# Patient Record
Sex: MALE | Race: WHITE | Employment: PART TIME | URBAN - METROPOLITAN AREA
[De-identification: names, ages, dates, MRNs, and addresses within clinical notes are randomized per-mention and may not be internally consistent; named-entity substitution may affect disease eponyms.]

---

## 2017-05-16 ENCOUNTER — TRANSFERRED RECORDS (OUTPATIENT)
Dept: HEALTH INFORMATION MANAGEMENT | Facility: CLINIC | Age: 67
End: 2017-05-16

## 2017-08-17 ENCOUNTER — OFFICE VISIT (OUTPATIENT)
Dept: CARDIOLOGY | Facility: CLINIC | Age: 67
End: 2017-08-17
Attending: INTERNAL MEDICINE
Payer: COMMERCIAL

## 2017-08-17 VITALS
HEIGHT: 66 IN | HEART RATE: 64 BPM | BODY MASS INDEX: 30.02 KG/M2 | SYSTOLIC BLOOD PRESSURE: 128 MMHG | DIASTOLIC BLOOD PRESSURE: 86 MMHG | WEIGHT: 186.8 LBS

## 2017-08-17 DIAGNOSIS — I25.10 CORONARY ARTERY DISEASE INVOLVING NATIVE CORONARY ARTERY OF NATIVE HEART WITHOUT ANGINA PECTORIS: ICD-10-CM

## 2017-08-17 DIAGNOSIS — E78.00 HYPERCHOLESTEREMIA: ICD-10-CM

## 2017-08-17 PROCEDURE — 99212 OFFICE O/P EST SF 10 MIN: CPT | Performed by: INTERNAL MEDICINE

## 2017-08-17 NOTE — LETTER
8/17/2017    CHRISTIN OLIVERA  [facility]   [address] [place] [address]    RE: Hardy Landry       Dear Colleague,    I had the pleasure of seeing Hardy Landry in the [facility].    Hardy Landry, a 67-year-old man with coronary artery disease and dyslipidemia was seen today at your request for followup.  The patient sustained an acute inferolateral wall ST segment elevation MI in 10/2010 and underwent implantation of a 2.75 x 18 mm length everolimus-eluting stent in the circumflex.  At the time of his MI, there were significant narrowings present in the mid LAD and distal right coronary.  The patient was unwilling to consider bypass surgery.  In 11/2010, we attempted to stent the mid LAD, but were unable to stent the vessel due to tortuosity and calcification. The patient underwent plain balloon angioplasty of the mid-LAD with good angiographic results.  In 2011, he returned for repeat angiography which demonstrated the  intervention sites in the circumflex and the mid LAD remained patent.  A 3.5 x 15 mm length everolimus-eluting stent was placed in the distal right coronary.  The patient's last angiogram in 2012 showed a normal ejection fraction of 50-55% with continued patency at all  intervention sites with 0% stenosis at the CX and RCA intervention sites and a 40% mid vessel LAD stenosis with CHRISTI 3 flow.     Mr. Landry continues to remain free of angina since I saw him 1 year ago. You have  questioned whether he should be on a beta blocker.  The patient has had well controlled blood pressures and lipid levels.      PAST MEDICAL HISTORY:   1.  Coronary artery disease.   a.  Acute inferolateral infarction in 2010.  Status post in-stent implantation in circumflex.   b.  Status balloon status post balloon angioplasty of mid LAD.   c.  Status post in-stent implantation in distal right coronary.     d.  Repeat angiography 2012 showing continued patency at all  intervention sites with normal ejection fraction.   2.  Dyslipidemia, on simvastatin.      PHYSICAL EXAMINATION:   GENERAL:  Exam today demonstrates a very pleasant, cooperative and intelligent 67-year-old man.   VITAL SIGNS:  His blood pressure is 128/86, his heart rate is 64.  His height is 1.7 meters, his weight is 84.7 kg.  His BMI is 30.2.   LUNGS:  Clear to percussion and auscultation.   CARDIOVASCULAR:  Shows a normal S1 with a normal S2.  There is no S3.  There is no murmur, rub or click.     Outpatient Encounter Prescriptions as of 8/17/2017   Medication Sig Dispense Refill     Fluticasone-Salmeterol (ADVAIR HFA IN)        simvastatin (ZOCOR) 40 MG tablet Take 1 tablet (40 mg) by mouth At Bedtime 90 tablet 0     aspirin 81 MG tablet Take 81 mg by mouth daily.       OMEPRAZOLE PO Take 20 mg by mouth as needed        No facility-administered encounter medications on file as of 8/17/2017.       ASSESSMENT:  Mr. Landry is asymptomatic on guideline-directed medical therapy with satisfactory systolic blood pressure and LDL cholesterol levels.  Presently, there is not clear evidence that beta blockers are beneficial in asymptomatic patients with well-preserved left ventricular function and normal systolic blood pressure over 5 years out from MI.   Mr. Landry has always indicated he prefers to take as few medications as possible and has been compliant with asa and statin therapy.  As long as the patient is asymptomatic, I would continue the present medical therapy.  Should he have recurrent symptoms, we will evaluate promptly.      RECOMMENDATIONS:   1.  Continue present medical therapy.   2.  Mediterranean style diet.   3.  Followup visit in 1 year, earlier if new symptoms.      We greatly appreciate the opportunity to see your patient, Mr. Hardy Landry.      Sincerely,    Jeet Simon MD     [facility]

## 2017-08-17 NOTE — PROGRESS NOTES
"HISTORY OF PRESENT ILLNESS:  No angina    Orders this Visit:  Orders Placed This Encounter   Procedures     Follow-Up with Cardiologist     No orders of the defined types were placed in this encounter.    There are no discontinued medications.    Encounter Diagnoses   Name Primary?     Hypercholesteremia      Coronary artery disease involving native coronary artery of native heart without angina pectoris        CURRENT MEDICATIONS:  Current Outpatient Prescriptions   Medication Sig Dispense Refill     Fluticasone-Salmeterol (ADVAIR HFA IN)        simvastatin (ZOCOR) 40 MG tablet Take 1 tablet (40 mg) by mouth At Bedtime 90 tablet 0     aspirin 81 MG tablet Take 81 mg by mouth daily.       OMEPRAZOLE PO Take 20 mg by mouth as needed          ALLERGIES   No Known Allergies    PAST MEDICAL, SURGICAL, FAMILY, SOCIAL HISTORY:  History was reviewed and updated as needed, see medical record.    Review of Systems:  A 12-point review of systems was completed, see medical record for detailed review of systems information.    Physical Exam:  Vitals: /86 (BP Location: Right arm, Patient Position: Sitting, Cuff Size: Adult Regular)  Pulse 64  Ht 1.676 m (5' 6\")  Wt 84.7 kg (186 lb 12.8 oz)  BMI 30.15 kg/m2    Constitutional:  cooperative, alert and oriented, well developed, well nourished, in no acute distress        Skin:  warm and dry to the touch, no apparent skin lesions or masses noted        Head:  normocephalic, no masses or lesions        Eyes:  pupils equal and round, conjunctivae and lids unremarkable, sclera white, no xanthalasma, EOMS intact, no nystagmus        ENT:  no pallor or cyanosis, dentition good        Neck:  carotid pulses are full and equal bilaterally, JVP normal, no carotid bruit, no thyromegaly        Chest:  normal breath sounds, clear to auscultation, normal A-P diameter, normal symmetry, normal respiratory excursion, no use of accessory muscles        Cardiac: regular rhythm, normal S1/S2, " no S3 or S4, apical impulse not displaced, no murmurs, gallops or rubs                  Abdomen:  abdomen soft, non-tender, BS normoactive, no mass, no HSM, no bruits        Vascular: pulses full and equal, no bruits auscultated                                      Extremities and Back:  no deformities, clubbing, cyanosis, erythema observed        Neurological:  affect appropriate, oriented to time, person and place        ASSESSMENT: Continue present meds, diet and exercise       RECOMMENDATIONS: fu in one year      Recent Lab Results:  LIPID RESULTS:  Lab Results   Component Value Date    CHOL 118 11/12/2010    HDL 47 11/12/2010    LDL 55 11/12/2010    TRIG 79 11/12/2010    CHOLHDLRATIO 2.5 11/12/2010       LIVER ENZYME RESULTS:  Lab Results   Component Value Date    ALT 19 11/12/2010       CBC RESULTS:  Lab Results   Component Value Date    WBC  11/28/2012     Charge credited   Test canceled by PCU/Clinic  [facility]  CORRECTED ON 11/28 AT 0911: PREVIOUSLY REPORTED AS 6.7    RBC  11/28/2012     Charge credited   Test canceled by PCU/Clinic  [facility]  CORRECTED ON 11/28 AT 0911: PREVIOUSLY REPORTED AS 4.52    HGB  11/28/2012     Charge credited   Test canceled by PCU/Clinic  [facility]  CORRECTED ON 11/28 AT 0911: PREVIOUSLY REPORTED AS 14.0    HCT  11/28/2012     Charge credited   Test canceled by PCU/Clinic  [facility]  CORRECTED ON 11/28 AT 0911: PREVIOUSLY REPORTED AS 41.0    MCV  11/28/2012     Charge credited   Test canceled by PCU/Clinic  [facility]  CORRECTED ON 11/28 AT 0911: PREVIOUSLY REPORTED AS 91    MCH  11/28/2012     Charge credited   Test canceled by PCU/Clinic  [facility]  CORRECTED ON 11/28 AT 0911: PREVIOUSLY REPORTED AS 31.0    MCHC  11/28/2012     Charge credited   Test canceled by PCU/Clinic  [facility]  CORRECTED ON 11/28 AT 0911: PREVIOUSLY REPORTED AS 34.1    RDW  11/28/2012     Charge credited   Test canceled by PCU/Clinic  [facility]  CORRECTED ON 11/28 AT 0911: PREVIOUSLY REPORTED AS 12.6    PLT  11/28/2012     Charge  credited   Test canceled by PCU/Clinic  [facility]  CORRECTED ON 11/28 AT 0911: PREVIOUSLY REPORTED        BMP RESULTS:  Lab Results   Component Value Date    NA  11/28/2012     Charge credited   Test canceled by PCU/Clinic  [facility]  CORRECTED ON 11/28 AT 0911: PREVIOUSLY REPORTED     POTASSIUM  11/28/2012     Charge credited   Test canceled by PCU/Clinic  [facility]  CORRECTED ON 11/28 AT 0911: PREVIOUSLY REPORTED AS 4.1    CHLORIDE  11/28/2012     Charge credited   Test canceled by PCU/Clinic  [facility]  CORRECTED ON 11/28 AT 0911: PREVIOUSLY REPORTED     CO2  11/28/2012     Charge credited   Test canceled by PCU/Clinic  [facility]  CORRECTED ON 11/28 AT 0911: PREVIOUSLY REPORTED AS 29    ANIONGAP  11/28/2012     Charge credited   Test canceled by PCU/Clinic  [facility]  CORRECTED ON 11/28 AT 0911: PREVIOUSLY REPORTED AS 9    GLC  11/28/2012     Charge credited   Test canceled by PCU/Clinic  [facility]  CORRECTED ON 11/28 AT 0911: PREVIOUSLY REPORTED AS 93    BUN  11/28/2012     Charge credited   Test canceled by PCU/Clinic  [facility]  CORRECTED ON 11/28 AT 0911: PREVIOUSLY REPORTED AS 15    CR  11/28/2012     Charge credited   Test canceled by PCU/Clinic  [facility]  CORRECTED ON 11/28 AT 0911: PREVIOUSLY REPORTED AS 1.28    GFRESTIMATED  11/28/2012     Charge credited   Test canceled by PCU/Clinic  [facility]  CORRECTED ON 11/28 AT 0911: PREVIOUSLY REPORTED AS 57    GFRESTBLACK  11/28/2012     Charge credited   Test canceled by PCU/Clinic  [facility]  CORRECTED ON 11/28 AT 0911: PREVIOUSLY REPORTED AS 69    MANNIE  11/28/2012     Charge credited   Test canceled by PCU/Clinic  [facility]  CORRECTED ON 11/28 AT 0911: PREVIOUSLY REPORTED AS 9.5        A1C RESULTS:  No results found for: A1C    INR RESULTS:  Lab Results   Component Value Date    INR  11/28/2012     Charge credited   Test canceled by PCU/Clinic  [facility]  CORRECTED ON 11/28 AT 0911: PREVIOUSLY REPORTED AS 0.96       We greatly appreciate the opportunity to be involved in the care of your  patient, Hardy REDDY Gris.    Sincerely,  Jeet Simon MD      CC  Jeet Simon MD  [address]  [place] [address]

## 2017-08-17 NOTE — PROGRESS NOTES
HISTORY OF PRESENT ILLNESS:  Hardy Landry, a 67-year-old man with coronary artery disease and dyslipidemia was seen today at your request for followup.  The patient sustained an acute inferolateral wall ST segment elevation MI in 10/2010 and underwent implantation of a 2.75 x 18 mm length everolimus-eluting stent in the circumflex.  At the time of his MI, there were significant narrowings present in the mid LAD and distal right coronary.  The patient was unwilling to consider bypass surgery.  In 11/2010, we attempted to stent the mid LAD, but were unable to stent the vessel due to tortuosity and calcification. The patient underwent plain balloon angioplasty of the mid-LAD with good angiographic results.  In 2011, he returned for repeat angiography which demonstrated the  intervention sites in the circumflex and the mid LAD remained patent.  A 3.5 x 15 mm length everolimus-eluting stent was placed in the distal right coronary.  The patient's last angiogram in 2012 showed a normal ejection fraction of 50-55% with continued patency at all  intervention sites with 0% stenosis at the CX and RCA intervention sites and a 40% mid vessel LAD stenosis with CHRISTI 3 flow.     Mr. Landry continues to remain free of angina since I saw him 1 year ago. You have  questioned whether he should be on a beta blocker.  The patient has had well controlled blood pressures and lipid levels.      PAST MEDICAL HISTORY:   1.  Coronary artery disease.   a.  Acute inferolateral infarction in 2010.  Status post in-stent implantation in circumflex.   b.  Status balloon status post balloon angioplasty of mid LAD.   c.  Status post in-stent implantation in distal right coronary.     d.  Repeat angiography 2012 showing continued patency at all intervention sites with normal ejection fraction.   2.  Dyslipidemia, on simvastatin.      PHYSICAL EXAMINATION:   GENERAL:  Exam today demonstrates a very pleasant, cooperative and intelligent 67-year-old  man.   VITAL SIGNS:  His blood pressure is 128/86, his heart rate is 64.  His height is 1.7 meters, his weight is 84.7 kg.  His BMI is 30.2.   LUNGS:  Clear to percussion and auscultation.   CARDIOVASCULAR:  Shows a normal S1 with a normal S2.  There is no S3.  There is no murmur, rub or click.      ASSESSMENT:  Mr. Landry is asymptomatic on guideline-directed medical therapy with satisfactory systolic blood pressure and LDL cholesterol levels.  Presently, there is not clear evidence that beta blockers are beneficial in asymptomatic patients with well-preserved left ventricular function and normal systolic blood pressure over 5 years out from MI.   Mr. Landry has always indicated he prefers to take as few medications as possible and has been compliant with asa and statin therapy.  As long as the patient is asymptomatic, I would continue the present medical therapy.  Should he have recurrent symptoms, we will evaluate promptly.      RECOMMENDATIONS:   1.  Continue present medical therapy.   2.  Mediterranean style diet.   3.  Followup visit in 1 year, earlier if new symptoms.      We greatly appreciate the opportunity to see your patient, Mr. Rae Landry.       cc:   Lamonte Leslie MD   [facility] [address] [place] [address]         HILARIO CORNELIUS MD             D: 2017 09:44   T: 2017 12:24   MT: [id number]      Name:     RAE LANDRY   MRN:      [id number]        Account:      [id number]   :      1950           Service Date: 2017      Document: [id number]

## 2018-08-14 ENCOUNTER — OFFICE VISIT (OUTPATIENT)
Dept: CARDIOLOGY | Facility: CLINIC | Age: 68
End: 2018-08-14
Attending: INTERNAL MEDICINE
Payer: COMMERCIAL

## 2018-08-14 VITALS
SYSTOLIC BLOOD PRESSURE: 128 MMHG | HEART RATE: 54 BPM | HEIGHT: 66 IN | BODY MASS INDEX: 29.25 KG/M2 | DIASTOLIC BLOOD PRESSURE: 82 MMHG | WEIGHT: 182 LBS

## 2018-08-14 DIAGNOSIS — I25.10 CORONARY ARTERY DISEASE INVOLVING NATIVE CORONARY ARTERY OF NATIVE HEART WITHOUT ANGINA PECTORIS: ICD-10-CM

## 2018-08-14 DIAGNOSIS — E78.00 HYPERCHOLESTEREMIA: ICD-10-CM

## 2018-08-14 PROCEDURE — 99213 OFFICE O/P EST LOW 20 MIN: CPT | Performed by: INTERNAL MEDICINE

## 2018-08-14 RX ORDER — IBUPROFEN 200 MG
400 TABLET ORAL EVERY 8 HOURS PRN
COMMUNITY
End: 2018-10-04

## 2018-08-14 NOTE — PROGRESS NOTES
"HISTORY OF PRESENT ILLNESS:  No chest pain or dyspnea.     Orders this Visit:  Orders Placed This Encounter   Procedures     Follow-Up with Cardiologist     Orders Placed This Encounter   Medications     ibuprofen (ADVIL/MOTRIN) 200 MG tablet     Sig: Take 400 mg by mouth every 8 hours as needed for mild pain     There are no discontinued medications.    Encounter Diagnoses   Name Primary?     Hypercholesteremia      Coronary artery disease involving native coronary artery of native heart without angina pectoris        CURRENT MEDICATIONS:  Current Outpatient Prescriptions   Medication Sig Dispense Refill     aspirin 81 MG tablet Take 81 mg by mouth daily.       Fluticasone-Salmeterol (ADVAIR HFA IN) Inhale into the lungs as needed        ibuprofen (ADVIL/MOTRIN) 200 MG tablet Take 400 mg by mouth every 8 hours as needed for mild pain       OMEPRAZOLE PO Take 20 mg by mouth 2 times daily (before meals)        simvastatin (ZOCOR) 40 MG tablet Take 1 tablet (40 mg) by mouth At Bedtime 90 tablet 0       ALLERGIES   No Known Allergies    PAST MEDICAL, SURGICAL, FAMILY, SOCIAL HISTORY:  History was reviewed and updated as needed, see medical record.    Review of Systems:  A 12-point review of systems was completed, see medical record for detailed review of systems information.    Physical Exam:  Vitals: /82 (BP Location: Right arm, Patient Position: Sitting, Cuff Size: Adult Large)  Pulse 54  Ht 1.676 m (5' 6\")  Wt 82.6 kg (182 lb)  BMI 29.38 kg/m2    Constitutional:  cooperative, alert and oriented, well developed, well nourished, in no acute distress        Skin:  warm and dry to the touch, no apparent skin lesions or masses noted        Head:  normocephalic, no masses or lesions        Eyes:  pupils equal and round, conjunctivae and lids unremarkable, sclera white, no xanthalasma, EOMS intact, no nystagmus        ENT:  no pallor or cyanosis, dentition good        Neck:  carotid pulses are full and equal " bilaterally, JVP normal, no carotid bruit        Chest:  normal breath sounds, clear to auscultation, normal A-P diameter, normal symmetry, normal respiratory excursion, no use of accessory muscles        Cardiac: regular rhythm, normal S1/S2, no S3 or S4, apical impulse not displaced, no murmurs, gallops or rubs                  Abdomen:  abdomen soft, non-tender, BS normoactive, no mass, no HSM, no bruits        Vascular:                                        Extremities and Back:  no deformities, clubbing, cyanosis, erythema observed        Neurological:  no gross motor deficits        ASSESSMENT:  Stable.        RECOMMENDATIONS: Continue present meds  Follow-up in one year      Recent Lab Results:  LIPID RESULTS:  Lab Results   Component Value Date    CHOL 118 11/12/2010    HDL 47 11/12/2010    LDL 55 11/12/2010    TRIG 79 11/12/2010    CHOLHDLRATIO 2.5 11/12/2010       LIVER ENZYME RESULTS:  Lab Results   Component Value Date    ALT 19 11/12/2010       CBC RESULTS:  Lab Results   Component Value Date    WBC  11/28/2012     Charge credited   Test canceled by PCU/Clinic  [facility]  CORRECTED ON 11/28 AT 0911: PREVIOUSLY REPORTED AS 6.7    RBC  11/28/2012     Charge credited   Test canceled by PCU/Clinic  [facility]  CORRECTED ON 11/28 AT 0911: PREVIOUSLY REPORTED AS 4.52    HGB  11/28/2012     Charge credited   Test canceled by PCU/Clinic  [facility]  CORRECTED ON 11/28 AT 0911: PREVIOUSLY REPORTED AS 14.0    HCT  11/28/2012     Charge credited   Test canceled by PCU/Clinic  [facility]  CORRECTED ON 11/28 AT 0911: PREVIOUSLY REPORTED AS 41.0    MCV  11/28/2012     Charge credited   Test canceled by PCU/Clinic  [facility]  CORRECTED ON 11/28 AT 0911: PREVIOUSLY REPORTED AS 91    MCH  11/28/2012     Charge credited   Test canceled by PCU/Clinic  [facility]  CORRECTED ON 11/28 AT 0911: PREVIOUSLY REPORTED AS 31.0    MCHC  11/28/2012     Charge credited   Test canceled by PCU/Clinic  [facility]  CORRECTED ON 11/28 AT 0911: PREVIOUSLY REPORTED AS  34.1    RDW  11/28/2012     Charge credited   Test canceled by PCU/Clinic  [facility]  CORRECTED ON 11/28 AT 0911: PREVIOUSLY REPORTED AS 12.6    PLT  11/28/2012     Charge credited   Test canceled by PCU/Clinic  [facility]  CORRECTED ON 11/28 AT 0911: PREVIOUSLY REPORTED        BMP RESULTS:  Lab Results   Component Value Date    NA  11/28/2012     Charge credited   Test canceled by PCU/Clinic  [facility]  CORRECTED ON 11/28 AT 0911: PREVIOUSLY REPORTED     POTASSIUM  11/28/2012     Charge credited   Test canceled by PCU/Clinic  [facility]  CORRECTED ON 11/28 AT 0911: PREVIOUSLY REPORTED AS 4.1    CHLORIDE  11/28/2012     Charge credited   Test canceled by PCU/Clinic  [facility]  CORRECTED ON 11/28 AT 0911: PREVIOUSLY REPORTED     CO2  11/28/2012     Charge credited   Test canceled by PCU/Clinic  [facility]  CORRECTED ON 11/28 AT 0911: PREVIOUSLY REPORTED AS 29    ANIONGAP  11/28/2012     Charge credited   Test canceled by PCU/Clinic  [facility]  CORRECTED ON 11/28 AT 0911: PREVIOUSLY REPORTED AS 9    GLC  11/28/2012     Charge credited   Test canceled by PCU/Clinic  [facility]  CORRECTED ON 11/28 AT 0911: PREVIOUSLY REPORTED AS 93    BUN  11/28/2012     Charge credited   Test canceled by PCU/Clinic  [facility]  CORRECTED ON 11/28 AT 0911: PREVIOUSLY REPORTED AS 15    CR  11/28/2012     Charge credited   Test canceled by PCU/Clinic  [facility]  CORRECTED ON 11/28 AT 0911: PREVIOUSLY REPORTED AS 1.28    GFRESTIMATED  11/28/2012     Charge credited   Test canceled by PCU/Clinic  [facility]  CORRECTED ON 11/28 AT 0911: PREVIOUSLY REPORTED AS 57    GFRESTBLACK  11/28/2012     Charge credited   Test canceled by PCU/Clinic  [facility]  CORRECTED ON 11/28 AT 0911: PREVIOUSLY REPORTED AS 69    MANNIE  11/28/2012     Charge credited   Test canceled by PCU/Clinic  [facility]  CORRECTED ON 11/28 AT 0911: PREVIOUSLY REPORTED AS 9.5        A1C RESULTS:  No results found for: A1C    INR RESULTS:  Lab Results   Component Value Date    INR  11/28/2012     Charge credited   Test  canceled by PCU/Clinic  [facility]  CORRECTED ON 11/28 AT 0911: PREVIOUSLY REPORTED AS 0.96       We greatly appreciate the opportunity to be involved in the care of your patient, Hardy Landry.    Sincerely,  Jeet Simon MD      CC  Jeet Simon MD  [address]  [place] [id number]

## 2018-08-14 NOTE — MR AVS SNAPSHOT
"              After Visit Summary   8/14/2018    Hardy Landry    MRN: [id number]           Patient Information     Date Of Birth          1950        Visit Information        Provider Department      8/14/2018 9:30 AM Jeet Simon MD [facility]        Today's Diagnoses     Hypercholesteremia        Coronary artery disease involving native coronary artery of native heart without angina pectoris           Follow-ups after your visit        Additional Services     Follow-Up with Cardiologist           Follow-Up with Cardiologist                 Future tests that were ordered for you today     Open Future Orders        Priority Expected Expires Ordered    Follow-Up with Cardiologist Routine 8/14/2019 8/15/2019 8/14/2018    Follow-Up with Cardiologist Routine 8/14/2019 8/15/2019 8/14/2018            Who to contact     If you have questions or need follow up information about today's clinic visit or your schedule please contact [facility] directly at [phone].  Normal or non-critical lab and imaging results will be communicated to you by [employer], letter or phone within 4 business days after the clinic has received the results. If you do not hear from us within 7 days, please contact the clinic through Mi[employer] or phone. If you have a critical or abnormal lab result, we will notify you by phone as soon as possible.  Submit refill requests through Chronic[employer] or call your pharmacy and they will forward the refill request to us. Please allow 3 business days for your refill to be completed.          Additional Information About Your Visit        MyChart Information     Chronicity lets you send messages to your doctor, view your test results, renew your prescriptions, schedule appointments and more. To sign up, go to [email or web address] . Click on \"Log in\" on the left side of the screen, which will take you to the Welcome " "page. Then click on \"Sign up Now\" on the right side of the page.     You will be asked to enter the access code listed below, as well as some personal information. Please follow the directions to create your username and password.     Your access code is: [email or web address]  Expires: 2018  9:59 AM     Your access code will  in 90 days. If you need help or a new code, please call your [place] clinic or [phone].        Care EveryWhere ID     This is your Care EveryWhere ID. This could be used by other organizations to access your [place] medical records  [id number]        Your Vitals Were     Pulse Height BMI (Body Mass Index)             54 1.676 m (5' 6\") 29.38 kg/m2          Blood Pressure from Last 3 Encounters:   18 128/82   17 128/86   16 112/72    Weight from Last 3 Encounters:   18 82.6 kg (182 lb)   17 84.7 kg (186 lb 12.8 oz)   16 83.9 kg (185 lb)              We Performed the Following     Follow-Up with Cardiologist        Primary Care Provider Office Phone # Fax #    Lamonte Leslie [phone]       [facility] [address] [place] [address]        Equal Access to Services     CANELO JACQUES AH: Hadii aad ku hadasho Soomaali, waaxda luqadaha, qaybta kaalmada adeegyada, janessa abraham. So [facility] [phone].    ATENCIÓN: Si habla español, tiene a land disposición servicios gratuitos de asistencia lingüística. Edwin al [phone].    We comply with applicable federal civil rights laws and [place] laws. We do not discriminate on the basis of race, color, national origin, age, disability, sex, sexual orientation, or gender identity.            Thank you!     Thank you for choosing [facility]  for your care. Our goal is always to provide you with excellent care. Hearing back from our patients is one way we can continue to improve our services. Please take a few " minutes to complete the written survey that you may receive in the mail after your visit with us. Thank you!             Your Updated Medication List - Protect others around you: Learn how to safely use, store and throw away your medicines at www.disposemymeds.org.          This list is accurate as of 8/14/18  9:59 AM.  Always use your most recent med list.                   Brand Name Dispense Instructions for use Diagnosis    ADVAIR HFA IN      Inhale into the lungs as needed        aspirin 81 MG tablet      Take 81 mg by mouth daily.        ibuprofen 200 MG tablet    ADVIL/MOTRIN     Take 400 mg by mouth every 8 hours as needed for mild pain        OMEPRAZOLE PO      Take 20 mg by mouth 2 times daily (before meals)        simvastatin 40 MG tablet    ZOCOR    90 tablet    Take 1 tablet (40 mg) by mouth At Bedtime    Hyperlipidemia LDL goal <100

## 2018-08-14 NOTE — LETTER
8/14/2018      LAOMNTE OLIVERA  [facility] [address]   [place] [address]      RE: Hardy Landry       Dear Colleague,    I had the pleasure of seeing Hardy Landry in the [facility].    Service Date: 08/14/2018      HISTORY OF PRESENT ILLNESS:  Hardy Landry, a 68-year-old man with coronary artery disease and dyslipidemia was seen today at your request for followup.      Since seen last year, Mr. Landry remains free of chest, arm, neck, jaw or back discomfort with activity.  He continues to play pickup basketball and other moderate level activities without limitation.      PAST MEDICAL HISTORY:   1.  Dyslipidemia - on simvastatin.   2.  Coronary artery disease.   a.  Inferolateral infarction in 2010, treated with stent implantation in circumflex.    b.  Status post balloon angioplasty of mid LAD (unable to advance stent due to calcification and tortuosity).    c.  Status post stent implantation in distal right coronary.   d.  Angiography 2012 showing continued patency at all intervention sites with normal ejection fraction.      PHYSICAL EXAMINATION:   GENERAL:  Exam today demonstrates a very pleasant and cooperative 68-year-old man.   VITAL SIGNS:  Blood pressure is 120/82, heart rate is 54.  His height is 1.7 meters, his weight is 82.6 kg.  His BMI is 29.4.   LUNGS:  Clear to percussion and auscultation.   CARDIOVASCULAR:  Shows a normal S1 with a normal S2.  There is no S3.  There is no murmur, rub or click.      ASSESSMENT:  Mr. Landry remains asymptomatic on guideline-directed medical therapy.  We have advised continued lifestyle intervention with exercise, diet and weight loss.  He is  contact us with any new symptoms prior to next scheduled follow up.      RECOMMENDATIONS:   1.  Continue present medical therapy.   2.  Followup visit with me in 1 year.      We greatly appreciate the opportunity to see your patient, Mr. Hardy Landry.      cc:   Lamonte SKY  MD Mariama   [facility] [address] [place]  [address]         JEET SIMON MD             D: 2018   T: 2018   MT: MOHINI      Name:     RAE MORAES   MRN:      [id number]        Account:      [id number]   :      1950           Service Date: 2018      Document: [id number]           Outpatient Encounter Prescriptions as of 2018   Medication Sig Dispense Refill     aspirin 81 MG tablet Take 81 mg by mouth daily.       Fluticasone-Salmeterol (ADVAIR HFA IN) Inhale into the lungs as needed        ibuprofen (ADVIL/MOTRIN) 200 MG tablet Take 400 mg by mouth every 8 hours as needed for mild pain       OMEPRAZOLE PO Take 20 mg by mouth 2 times daily (before meals)        simvastatin (ZOCOR) 40 MG tablet Take 1 tablet (40 mg) by mouth At Bedtime 90 tablet 0     No facility-administered encounter medications on file as of 2018.            Again, thank you for allowing me to participate in the care of your patient.      Sincerely,    Jeet Simon MD     [facility]

## 2018-08-14 NOTE — LETTER
"8/14/2018    CHRISTIN QUINTERO MAGDIELYANCY  [facility] [address]   [place] [address]    RE: Hardy Landry       Dear Colleague,    I had the pleasure of seeing Hardy Landry in the [facility].    HISTORY OF PRESENT ILLNESS:  No chest pain or dyspnea.     Orders this Visit:  Orders Placed This Encounter   Procedures     Follow-Up with Cardiologist     Orders Placed This Encounter   Medications     ibuprofen (ADVIL/MOTRIN) 200 MG tablet     Sig: Take 400 mg by mouth every 8 hours as needed for mild pain     There are no discontinued medications.    Encounter Diagnoses   Name Primary?     Hypercholesteremia      Coronary artery disease involving native coronary artery of native heart without angina pectoris        CURRENT MEDICATIONS:  Current Outpatient Prescriptions   Medication Sig Dispense Refill     aspirin 81 MG tablet Take 81 mg by mouth daily.       Fluticasone-Salmeterol (ADVAIR HFA IN) Inhale into the lungs as needed        ibuprofen (ADVIL/MOTRIN) 200 MG tablet Take 400 mg by mouth every 8 hours as needed for mild pain       OMEPRAZOLE PO Take 20 mg by mouth 2 times daily (before meals)        simvastatin (ZOCOR) 40 MG tablet Take 1 tablet (40 mg) by mouth At Bedtime 90 tablet 0       ALLERGIES   No Known Allergies    PAST MEDICAL, SURGICAL, FAMILY, SOCIAL HISTORY:  History was reviewed and updated as needed, see medical record.    Review of Systems:  A 12-point review of systems was completed, see medical record for detailed review of systems information.    Physical Exam:  Vitals: /82 (BP Location: Right arm, Patient Position: Sitting, Cuff Size: Adult Large)  Pulse 54  Ht 1.676 m (5' 6\")  Wt 82.6 kg (182 lb)  BMI 29.38 kg/m2    Constitutional:  cooperative, alert and oriented, well developed, well nourished, in no acute distress        Skin:  warm and dry to the touch, no apparent skin lesions or masses noted        Head:  normocephalic, no masses or lesions  "       Eyes:  pupils equal and round, conjunctivae and lids unremarkable, sclera white, no xanthalasma, EOMS intact, no nystagmus        ENT:  no pallor or cyanosis, dentition good        Neck:  carotid pulses are full and equal bilaterally, JVP normal, no carotid bruit        Chest:  normal breath sounds, clear to auscultation, normal A-P diameter, normal symmetry, normal respiratory excursion, no use of accessory muscles        Cardiac: regular rhythm, normal S1/S2, no S3 or S4, apical impulse not displaced, no murmurs, gallops or rubs                  Abdomen:  abdomen soft, non-tender, BS normoactive, no mass, no HSM, no bruits        Vascular:                                        Extremities and Back:  no deformities, clubbing, cyanosis, erythema observed        Neurological:  no gross motor deficits        ASSESSMENT:  Stable.        RECOMMENDATIONS: Continue present meds  Follow-up in one year      Recent Lab Results:  LIPID RESULTS:  Lab Results   Component Value Date    CHOL 118 11/12/2010    HDL 47 11/12/2010    LDL 55 11/12/2010    TRIG 79 11/12/2010    CHOLHDLRATIO 2.5 11/12/2010       LIVER ENZYME RESULTS:  Lab Results   Component Value Date    ALT 19 11/12/2010       CBC RESULTS:  Lab Results   Component Value Date    WBC  11/28/2012     Charge credited   Test canceled by PCU/Clinic  [facility]  CORRECTED ON 11/28 AT 0911: PREVIOUSLY REPORTED AS 6.7    RBC  11/28/2012     Charge credited   Test canceled by PCU/Clinic  [facility]  CORRECTED ON 11/28 AT 0911: PREVIOUSLY REPORTED AS 4.52    HGB  11/28/2012     Charge credited   Test canceled by PCU/Clinic  [facility]  CORRECTED ON 11/28 AT 0911: PREVIOUSLY REPORTED AS 14.0    HCT  11/28/2012     Charge credited   Test canceled by PCU/Clinic  [facility]  CORRECTED ON 11/28 AT 0911: PREVIOUSLY REPORTED AS 41.0    MCV  11/28/2012     Charge credited   Test canceled by PCU/Clinic  [facility]  CORRECTED ON 11/28 AT 0911: PREVIOUSLY REPORTED AS 91    MCH  11/28/2012     Charge  credited   Test canceled by PCU/Clinic  [facility]  CORRECTED ON 11/28 AT 0911: PREVIOUSLY REPORTED AS 31.0    MCHC  11/28/2012     Charge credited   Test canceled by PCU/Clinic  [facility]  CORRECTED ON 11/28 AT 0911: PREVIOUSLY REPORTED AS 34.1    RDW  11/28/2012     Charge credited   Test canceled by PCU/Clinic  [facility]  CORRECTED ON 11/28 AT 0911: PREVIOUSLY REPORTED AS 12.6    PLT  11/28/2012     Charge credited   Test canceled by PCU/Clinic  [facility]  CORRECTED ON 11/28 AT 0911: PREVIOUSLY REPORTED        BMP RESULTS:  Lab Results   Component Value Date    NA  11/28/2012     Charge credited   Test canceled by PCU/Clinic  [facility]  CORRECTED ON 11/28 AT 0911: PREVIOUSLY REPORTED     POTASSIUM  11/28/2012     Charge credited   Test canceled by PCU/Clinic  [facility]  CORRECTED ON 11/28 AT 0911: PREVIOUSLY REPORTED AS 4.1    CHLORIDE  11/28/2012     Charge credited   Test canceled by PCU/Clinic  [facility]  CORRECTED ON 11/28 AT 0911: PREVIOUSLY REPORTED     CO2  11/28/2012     Charge credited   Test canceled by PCU/Clinic  [facility]  CORRECTED ON 11/28 AT 0911: PREVIOUSLY REPORTED AS 29    ANIONGAP  11/28/2012     Charge credited   Test canceled by PCU/Clinic  [facility]  CORRECTED ON 11/28 AT 0911: PREVIOUSLY REPORTED AS 9    GLC  11/28/2012     Charge credited   Test canceled by PCU/Clinic  [facility]  CORRECTED ON 11/28 AT 0911: PREVIOUSLY REPORTED AS 93    BUN  11/28/2012     Charge credited   Test canceled by PCU/Clinic  [facility]  CORRECTED ON 11/28 AT 0911: PREVIOUSLY REPORTED AS 15    CR  11/28/2012     Charge credited   Test canceled by PCU/Clinic  [facility]  CORRECTED ON 11/28 AT 0911: PREVIOUSLY REPORTED AS 1.28    GFRESTIMATED  11/28/2012     Charge credited   Test canceled by PCU/Clinic  [facility]  CORRECTED ON 11/28 AT 0911: PREVIOUSLY REPORTED AS 57    GFRESTBLACK  11/28/2012     Charge credited   Test canceled by PCU/Clinic  [facility]  CORRECTED ON 11/28 AT 0911: PREVIOUSLY REPORTED AS 69    MANNIE  11/28/2012     Charge credited   Test  canceled by PCU/Clinic  [facility]  CORRECTED ON 11/28 AT 0911: PREVIOUSLY REPORTED AS 9.5        A1C RESULTS:  No results found for: A1C    INR RESULTS:  Lab Results   Component Value Date    INR  11/28/2012     Charge credited   Test canceled by PCU/Clinic  [facility]  CORRECTED ON 11/28 AT 0911: PREVIOUSLY REPORTED AS 0.96       We greatly appreciate the opportunity to be involved in the care of your patient, Hardy Landry.    Sincerely,  Jeet Simon MD      CC  Jeet Simon MD  [address]  [place] [id number]                                                                       Thank you for allowing me to participate in the care of your patient.      Sincerely,     Jeet Simon MD     [facility]    cc:   Jeet Simon MD  [address]  [place] [id number]

## 2018-08-14 NOTE — PROGRESS NOTES
Service Date: 08/14/2018      HISTORY OF PRESENT ILLNESS:  Rae Landry, a 68-year-old man with coronary artery disease and dyslipidemia was seen today at your request for followup.      Since seen last year, Mr. Landry remains free of chest, arm, neck, jaw or back discomfort with activity.  He continues to play pickup basketball and other moderate level activities without limitation.      PAST MEDICAL HISTORY:   1.  Dyslipidemia - on simvastatin.   2.  Coronary artery disease.   a.  Inferolateral infarction in 2010, treated with stent implantation in circumflex.    b.  Status post balloon angioplasty of mid LAD (unable to advance stent due to calcification and tortuosity).    c.  Status post stent implantation in distal right coronary.   d.  Angiography 2012 showing continued patency at all intervention sites with normal ejection fraction.      PHYSICAL EXAMINATION:   GENERAL:  Exam today demonstrates a very pleasant and cooperative 68-year-old man.   VITAL SIGNS:  Blood pressure is 120/82, heart rate is 54.  His height is 1.7 meters, his weight is 82.6 kg.  His BMI is 29.4.   LUNGS:  Clear to percussion and auscultation.   CARDIOVASCULAR:  Shows a normal S1 with a normal S2.  There is no S3.  There is no murmur, rub or click.      ASSESSMENT:  Mr. Landry remains asymptomatic on guideline-directed medical therapy.  We have advised continued lifestyle intervention with exercise, diet and weight loss.  He is  contact us with any new symptoms prior to next scheduled follow up.      RECOMMENDATIONS:   1.  Continue present medical therapy.   2.  Followup visit with me in 1 year.      We greatly appreciate the opportunity to see your patient, Mr. Rae Landry.      cc:   Lamonte Leslie MD   [facility] [address] [place] [address]         HILARIO CORNELIUS MD             D: 08/14/2018   T: 08/14/2018   MT: MOHINI      Name:     RAE LANDRY   MRN:      [id number]        Account:       [id number]   :      1950           Service Date: 2018      Document: [id number]

## 2018-10-04 ENCOUNTER — APPOINTMENT (OUTPATIENT)
Dept: CARDIOLOGY | Facility: CLINIC | Age: 68
DRG: 247 | End: 2018-10-04
Attending: INTERNAL MEDICINE
Payer: COMMERCIAL

## 2018-10-04 ENCOUNTER — HOSPITAL ENCOUNTER (INPATIENT)
Facility: CLINIC | Age: 68
LOS: 2 days | Discharge: HOME | DRG: 247 | End: 2018-10-06
Attending: INTERNAL MEDICINE | Admitting: INTERNAL MEDICINE
Payer: COMMERCIAL

## 2018-10-04 ENCOUNTER — TRANSFERRED RECORDS (OUTPATIENT)
Dept: HEALTH INFORMATION MANAGEMENT | Facility: CLINIC | Age: 68
End: 2018-10-04

## 2018-10-04 DIAGNOSIS — I21.11 ST ELEVATION MYOCARDIAL INFARCTION INVOLVING RIGHT CORONARY ARTERY (H): Primary | ICD-10-CM

## 2018-10-04 PROBLEM — I21.3 STEMI (ST ELEVATION MYOCARDIAL INFARCTION) (H): Status: ACTIVE | Noted: 2018-10-04

## 2018-10-04 LAB
ANION GAP SERPL CALCULATED.3IONS-SCNC: 8 MMOL/L (ref 3–14)
BUN SERPL-MCNC: 14 MG/DL (ref 7–30)
CALCIUM SERPL-MCNC: 8.4 MG/DL (ref 8.5–10.1)
CHLORIDE SERPL-SCNC: 104 MMOL/L (ref 94–109)
CHOLEST SERPL-MCNC: 129 MG/DL
CO2 SERPL-SCNC: 26 MMOL/L (ref 20–32)
CREAT SERPL-MCNC: 0.94 MG/DL (ref 0.66–1.25)
ERYTHROCYTE [DISTWIDTH] IN BLOOD BY AUTOMATED COUNT: 12.8 % (ref 10–15)
GFR SERPL CREATININE-BSD FRML MDRD: 80 ML/MIN/1.7M2
GLUCOSE SERPL-MCNC: 96 MG/DL (ref 70–99)
HCT VFR BLD AUTO: 41.8 % (ref 40–53)
HDLC SERPL-MCNC: 64 MG/DL
HGB BLD-MCNC: 14.5 G/DL (ref 13.3–17.7)
KCT BLD-ACNC: 257 SEC (ref 75–150)
KCT BLD-ACNC: 266 SEC (ref 75–150)
LDLC SERPL CALC-MCNC: 58 MG/DL
MCH RBC QN AUTO: 31.8 PG (ref 26.5–33)
MCHC RBC AUTO-ENTMCNC: 34.7 G/DL (ref 31.5–36.5)
MCV RBC AUTO: 92 FL (ref 78–100)
NONHDLC SERPL-MCNC: 65 MG/DL
PLATELET # BLD AUTO: 211 10E9/L (ref 150–450)
POTASSIUM SERPL-SCNC: 4.3 MMOL/L (ref 3.4–5.3)
RBC # BLD AUTO: 4.56 10E12/L (ref 4.4–5.9)
SODIUM SERPL-SCNC: 138 MMOL/L (ref 133–144)
TRIGL SERPL-MCNC: 34 MG/DL
WBC # BLD AUTO: 10.6 10E9/L (ref 4–11)

## 2018-10-04 PROCEDURE — 99222 1ST HOSP IP/OBS MODERATE 55: CPT | Mod: AI | Performed by: INTERNAL MEDICINE

## 2018-10-04 PROCEDURE — C1887 CATHETER, GUIDING: HCPCS

## 2018-10-04 PROCEDURE — 21000000 ZZH R&B IMCU HEART CARE

## 2018-10-04 PROCEDURE — 027034Z DILATION OF CORONARY ARTERY, ONE ARTERY WITH DRUG-ELUTING INTRALUMINAL DEVICE, PERCUTANEOUS APPROACH: ICD-10-PCS | Performed by: INTERNAL MEDICINE

## 2018-10-04 PROCEDURE — 80061 LIPID PANEL: CPT | Performed by: INTERNAL MEDICINE

## 2018-10-04 PROCEDURE — 25000125 ZZHC RX 250: Performed by: INTERNAL MEDICINE

## 2018-10-04 PROCEDURE — 99152 MOD SED SAME PHYS/QHP 5/>YRS: CPT

## 2018-10-04 PROCEDURE — 36415 COLL VENOUS BLD VENIPUNCTURE: CPT | Performed by: INTERNAL MEDICINE

## 2018-10-04 PROCEDURE — 27210795 ZZH PAD DEFIB QUICK CR4

## 2018-10-04 PROCEDURE — 99152 MOD SED SAME PHYS/QHP 5/>YRS: CPT | Performed by: INTERNAL MEDICINE

## 2018-10-04 PROCEDURE — 25000128 H RX IP 250 OP 636: Performed by: INTERNAL MEDICINE

## 2018-10-04 PROCEDURE — C1874 STENT, COATED/COV W/DEL SYS: HCPCS

## 2018-10-04 PROCEDURE — 40000268 ZZH STATISTIC NO CHARGES

## 2018-10-04 PROCEDURE — 85347 COAGULATION TIME ACTIVATED: CPT

## 2018-10-04 PROCEDURE — 85027 COMPLETE CBC AUTOMATED: CPT | Performed by: INTERNAL MEDICINE

## 2018-10-04 PROCEDURE — 25000132 ZZH RX MED GY IP 250 OP 250 PS 637: Performed by: INTERNAL MEDICINE

## 2018-10-04 PROCEDURE — 4A023N7 MEASUREMENT OF CARDIAC SAMPLING AND PRESSURE, LEFT HEART, PERCUTANEOUS APPROACH: ICD-10-PCS | Performed by: INTERNAL MEDICINE

## 2018-10-04 PROCEDURE — 93010 ELECTROCARDIOGRAM REPORT: CPT | Performed by: INTERNAL MEDICINE

## 2018-10-04 PROCEDURE — 93458 L HRT ARTERY/VENTRICLE ANGIO: CPT

## 2018-10-04 PROCEDURE — 80048 BASIC METABOLIC PNL TOTAL CA: CPT | Performed by: INTERNAL MEDICINE

## 2018-10-04 PROCEDURE — 27210998 ZZH ACCESS HEART CATH CR6

## 2018-10-04 PROCEDURE — C1769 GUIDE WIRE: HCPCS

## 2018-10-04 PROCEDURE — C1760 CLOSURE DEV, VASC: HCPCS

## 2018-10-04 PROCEDURE — 99153 MOD SED SAME PHYS/QHP EA: CPT

## 2018-10-04 PROCEDURE — 27211089 ZZH KIT ACIST INJECTOR CR3

## 2018-10-04 PROCEDURE — B2111ZZ FLUOROSCOPY OF MULTIPLE CORONARY ARTERIES USING LOW OSMOLAR CONTRAST: ICD-10-PCS | Performed by: INTERNAL MEDICINE

## 2018-10-04 PROCEDURE — 27210744 ZZH CATH CR12

## 2018-10-04 PROCEDURE — 93458 L HRT ARTERY/VENTRICLE ANGIO: CPT | Mod: 26 | Performed by: INTERNAL MEDICINE

## 2018-10-04 PROCEDURE — 27210946 ZZH KIT HC TOTES DISP CR8

## 2018-10-04 PROCEDURE — 93005 ELECTROCARDIOGRAM TRACING: CPT

## 2018-10-04 PROCEDURE — C9606 PERC D-E COR REVASC W AMI S: HCPCS

## 2018-10-04 PROCEDURE — 3E033XZ INTRODUCTION OF VASOPRESSOR INTO PERIPHERAL VEIN, PERCUTANEOUS APPROACH: ICD-10-PCS | Performed by: INTERNAL MEDICINE

## 2018-10-04 PROCEDURE — 27210759 ZZH DEVICE INFLATION CR6

## 2018-10-04 PROCEDURE — C1725 CATH, TRANSLUMIN NON-LASER: HCPCS

## 2018-10-04 PROCEDURE — 92941 PRQ TRLML REVSC TOT OCCL AMI: CPT | Mod: RC | Performed by: INTERNAL MEDICINE

## 2018-10-04 PROCEDURE — 27210787 ZZH MANIFOLD CR2

## 2018-10-04 PROCEDURE — B2151ZZ FLUOROSCOPY OF LEFT HEART USING LOW OSMOLAR CONTRAST: ICD-10-PCS | Performed by: INTERNAL MEDICINE

## 2018-10-04 RX ORDER — FLUMAZENIL 0.1 MG/ML
0.2 INJECTION, SOLUTION INTRAVENOUS
Status: DISCONTINUED | OUTPATIENT
Start: 2018-10-04 | End: 2018-10-04 | Stop reason: HOSPADM

## 2018-10-04 RX ORDER — NALOXONE HYDROCHLORIDE 0.4 MG/ML
0.4 INJECTION, SOLUTION INTRAMUSCULAR; INTRAVENOUS; SUBCUTANEOUS EVERY 5 MIN PRN
Status: DISCONTINUED | OUTPATIENT
Start: 2018-10-04 | End: 2018-10-04 | Stop reason: HOSPADM

## 2018-10-04 RX ORDER — FUROSEMIDE 10 MG/ML
20-100 INJECTION INTRAMUSCULAR; INTRAVENOUS
Status: DISCONTINUED | OUTPATIENT
Start: 2018-10-04 | End: 2018-10-04 | Stop reason: HOSPADM

## 2018-10-04 RX ORDER — SIMVASTATIN 40 MG
40 TABLET ORAL AT BEDTIME
Status: DISCONTINUED | OUTPATIENT
Start: 2018-10-04 | End: 2018-10-04

## 2018-10-04 RX ORDER — ASPIRIN 81 MG/1
81 TABLET ORAL DAILY
Status: DISCONTINUED | OUTPATIENT
Start: 2018-10-05 | End: 2018-10-06 | Stop reason: HOSPADM

## 2018-10-04 RX ORDER — LORAZEPAM 2 MG/ML
.5-2 INJECTION INTRAMUSCULAR EVERY 4 HOURS PRN
Status: DISCONTINUED | OUTPATIENT
Start: 2018-10-04 | End: 2018-10-04 | Stop reason: HOSPADM

## 2018-10-04 RX ORDER — NICARDIPINE HYDROCHLORIDE 2.5 MG/ML
100 INJECTION INTRAVENOUS
Status: DISCONTINUED | OUTPATIENT
Start: 2018-10-04 | End: 2018-10-04 | Stop reason: HOSPADM

## 2018-10-04 RX ORDER — DOBUTAMINE HYDROCHLORIDE 200 MG/100ML
2-20 INJECTION INTRAVENOUS CONTINUOUS PRN
Status: DISCONTINUED | OUTPATIENT
Start: 2018-10-04 | End: 2018-10-04 | Stop reason: HOSPADM

## 2018-10-04 RX ORDER — NALOXONE HYDROCHLORIDE 0.4 MG/ML
.2-.4 INJECTION, SOLUTION INTRAMUSCULAR; INTRAVENOUS; SUBCUTANEOUS
Status: ACTIVE | OUTPATIENT
Start: 2018-10-04 | End: 2018-10-05

## 2018-10-04 RX ORDER — MORPHINE SULFATE 2 MG/ML
1-2 INJECTION, SOLUTION INTRAMUSCULAR; INTRAVENOUS EVERY 5 MIN PRN
Status: DISCONTINUED | OUTPATIENT
Start: 2018-10-04 | End: 2018-10-04 | Stop reason: HOSPADM

## 2018-10-04 RX ORDER — ONDANSETRON 4 MG/1
4 TABLET, ORALLY DISINTEGRATING ORAL EVERY 6 HOURS PRN
Status: DISCONTINUED | OUTPATIENT
Start: 2018-10-04 | End: 2018-10-06 | Stop reason: HOSPADM

## 2018-10-04 RX ORDER — NALOXONE HYDROCHLORIDE 0.4 MG/ML
.1-.4 INJECTION, SOLUTION INTRAMUSCULAR; INTRAVENOUS; SUBCUTANEOUS
Status: DISCONTINUED | OUTPATIENT
Start: 2018-10-04 | End: 2018-10-04

## 2018-10-04 RX ORDER — METHYLPREDNISOLONE SODIUM SUCCINATE 125 MG/2ML
125 INJECTION, POWDER, LYOPHILIZED, FOR SOLUTION INTRAMUSCULAR; INTRAVENOUS
Status: DISCONTINUED | OUTPATIENT
Start: 2018-10-04 | End: 2018-10-04 | Stop reason: HOSPADM

## 2018-10-04 RX ORDER — NITROGLYCERIN 0.4 MG/1
0.4 TABLET SUBLINGUAL EVERY 5 MIN PRN
Status: DISCONTINUED | OUTPATIENT
Start: 2018-10-04 | End: 2018-10-04 | Stop reason: HOSPADM

## 2018-10-04 RX ORDER — PROTAMINE SULFATE 10 MG/ML
25-100 INJECTION, SOLUTION INTRAVENOUS EVERY 5 MIN PRN
Status: DISCONTINUED | OUTPATIENT
Start: 2018-10-04 | End: 2018-10-04 | Stop reason: HOSPADM

## 2018-10-04 RX ORDER — NIFEDIPINE 10 MG/1
10 CAPSULE ORAL
Status: DISCONTINUED | OUTPATIENT
Start: 2018-10-04 | End: 2018-10-04 | Stop reason: HOSPADM

## 2018-10-04 RX ORDER — DEXTROSE MONOHYDRATE 25 G/50ML
12.5-5 INJECTION, SOLUTION INTRAVENOUS EVERY 30 MIN PRN
Status: DISCONTINUED | OUTPATIENT
Start: 2018-10-04 | End: 2018-10-04 | Stop reason: HOSPADM

## 2018-10-04 RX ORDER — NITROGLYCERIN 0.4 MG/1
0.4 TABLET SUBLINGUAL EVERY 5 MIN PRN
Status: DISCONTINUED | OUTPATIENT
Start: 2018-10-04 | End: 2018-10-06 | Stop reason: HOSPADM

## 2018-10-04 RX ORDER — HYDRALAZINE HYDROCHLORIDE 20 MG/ML
10-20 INJECTION INTRAMUSCULAR; INTRAVENOUS
Status: DISCONTINUED | OUTPATIENT
Start: 2018-10-04 | End: 2018-10-04 | Stop reason: HOSPADM

## 2018-10-04 RX ORDER — ENALAPRILAT 1.25 MG/ML
1.25-2.5 INJECTION INTRAVENOUS
Status: DISCONTINUED | OUTPATIENT
Start: 2018-10-04 | End: 2018-10-04 | Stop reason: HOSPADM

## 2018-10-04 RX ORDER — LIDOCAINE HYDROCHLORIDE 10 MG/ML
1-10 INJECTION, SOLUTION EPIDURAL; INFILTRATION; INTRACAUDAL; PERINEURAL
Status: COMPLETED | OUTPATIENT
Start: 2018-10-04 | End: 2018-10-04

## 2018-10-04 RX ORDER — ADENOSINE 3 MG/ML
12-12000 INJECTION, SOLUTION INTRAVENOUS
Status: DISCONTINUED | OUTPATIENT
Start: 2018-10-04 | End: 2018-10-04 | Stop reason: HOSPADM

## 2018-10-04 RX ORDER — PROTAMINE SULFATE 10 MG/ML
1-5 INJECTION, SOLUTION INTRAVENOUS
Status: DISCONTINUED | OUTPATIENT
Start: 2018-10-04 | End: 2018-10-04 | Stop reason: HOSPADM

## 2018-10-04 RX ORDER — CLOPIDOGREL 300 MG/1
300-600 TABLET, FILM COATED ORAL
Status: DISCONTINUED | OUTPATIENT
Start: 2018-10-04 | End: 2018-10-04 | Stop reason: HOSPADM

## 2018-10-04 RX ORDER — DOPAMINE HYDROCHLORIDE 160 MG/100ML
2-20 INJECTION, SOLUTION INTRAVENOUS CONTINUOUS PRN
Status: DISCONTINUED | OUTPATIENT
Start: 2018-10-04 | End: 2018-10-04 | Stop reason: HOSPADM

## 2018-10-04 RX ORDER — FENTANYL CITRATE 50 UG/ML
25-50 INJECTION, SOLUTION INTRAMUSCULAR; INTRAVENOUS
Status: DISCONTINUED | OUTPATIENT
Start: 2018-10-04 | End: 2018-10-04 | Stop reason: HOSPADM

## 2018-10-04 RX ORDER — PHENYLEPHRINE HCL IN 0.9% NACL 1 MG/10 ML
20-100 SYRINGE (ML) INTRAVENOUS
Status: DISCONTINUED | OUTPATIENT
Start: 2018-10-04 | End: 2018-10-04 | Stop reason: HOSPADM

## 2018-10-04 RX ORDER — EPINEPHRINE 1 MG/ML
0.3 INJECTION, SOLUTION, CONCENTRATE INTRAVENOUS
Status: DISCONTINUED | OUTPATIENT
Start: 2018-10-04 | End: 2018-10-04 | Stop reason: HOSPADM

## 2018-10-04 RX ORDER — ASPIRIN 81 MG/1
81-324 TABLET, CHEWABLE ORAL
Status: DISCONTINUED | OUTPATIENT
Start: 2018-10-04 | End: 2018-10-04 | Stop reason: HOSPADM

## 2018-10-04 RX ORDER — FLUMAZENIL 0.1 MG/ML
0.2 INJECTION, SOLUTION INTRAVENOUS
Status: ACTIVE | OUTPATIENT
Start: 2018-10-04 | End: 2018-10-05

## 2018-10-04 RX ORDER — ACETAMINOPHEN 325 MG/1
325-650 TABLET ORAL EVERY 4 HOURS PRN
Status: DISCONTINUED | OUTPATIENT
Start: 2018-10-04 | End: 2018-10-06 | Stop reason: HOSPADM

## 2018-10-04 RX ORDER — VERAPAMIL HYDROCHLORIDE 2.5 MG/ML
1-2.5 INJECTION, SOLUTION INTRAVENOUS
Status: DISCONTINUED | OUTPATIENT
Start: 2018-10-04 | End: 2018-10-04 | Stop reason: HOSPADM

## 2018-10-04 RX ORDER — ASPIRIN 325 MG
325 TABLET ORAL
Status: DISCONTINUED | OUTPATIENT
Start: 2018-10-04 | End: 2018-10-04 | Stop reason: HOSPADM

## 2018-10-04 RX ORDER — BUPIVACAINE HYDROCHLORIDE 2.5 MG/ML
1-10 INJECTION, SOLUTION EPIDURAL; INFILTRATION; INTRACAUDAL
Status: DISCONTINUED | OUTPATIENT
Start: 2018-10-04 | End: 2018-10-04 | Stop reason: HOSPADM

## 2018-10-04 RX ORDER — ATROPINE SULFATE 0.1 MG/ML
.5-1 INJECTION INTRAVENOUS
Status: DISCONTINUED | OUTPATIENT
Start: 2018-10-04 | End: 2018-10-04 | Stop reason: HOSPADM

## 2018-10-04 RX ORDER — SODIUM NITROPRUSSIDE 25 MG/ML
100-200 INJECTION INTRAVENOUS
Status: DISCONTINUED | OUTPATIENT
Start: 2018-10-04 | End: 2018-10-04 | Stop reason: HOSPADM

## 2018-10-04 RX ORDER — ACETAMINOPHEN 325 MG/1
650 TABLET ORAL EVERY 4 HOURS PRN
Status: DISCONTINUED | OUTPATIENT
Start: 2018-10-04 | End: 2018-10-04

## 2018-10-04 RX ORDER — POTASSIUM CHLORIDE 29.8 MG/ML
20 INJECTION INTRAVENOUS
Status: DISCONTINUED | OUTPATIENT
Start: 2018-10-04 | End: 2018-10-04 | Stop reason: HOSPADM

## 2018-10-04 RX ORDER — ONDANSETRON 2 MG/ML
4 INJECTION INTRAMUSCULAR; INTRAVENOUS EVERY 4 HOURS PRN
Status: DISCONTINUED | OUTPATIENT
Start: 2018-10-04 | End: 2018-10-04 | Stop reason: HOSPADM

## 2018-10-04 RX ORDER — NITROGLYCERIN 5 MG/ML
100-500 VIAL (ML) INTRAVENOUS
Status: DISCONTINUED | OUTPATIENT
Start: 2018-10-04 | End: 2018-10-04 | Stop reason: HOSPADM

## 2018-10-04 RX ORDER — DIPHENHYDRAMINE HYDROCHLORIDE 50 MG/ML
25-50 INJECTION INTRAMUSCULAR; INTRAVENOUS
Status: DISCONTINUED | OUTPATIENT
Start: 2018-10-04 | End: 2018-10-04 | Stop reason: HOSPADM

## 2018-10-04 RX ORDER — METOPROLOL TARTRATE 1 MG/ML
5 INJECTION, SOLUTION INTRAVENOUS EVERY 5 MIN PRN
Status: DISCONTINUED | OUTPATIENT
Start: 2018-10-04 | End: 2018-10-04 | Stop reason: HOSPADM

## 2018-10-04 RX ORDER — SODIUM CHLORIDE 9 MG/ML
INJECTION, SOLUTION INTRAVENOUS CONTINUOUS
Status: ACTIVE | OUTPATIENT
Start: 2018-10-04 | End: 2018-10-05

## 2018-10-04 RX ORDER — CLOPIDOGREL BISULFATE 75 MG/1
75 TABLET ORAL
Status: DISCONTINUED | OUTPATIENT
Start: 2018-10-04 | End: 2018-10-04 | Stop reason: HOSPADM

## 2018-10-04 RX ORDER — HEPARIN SODIUM 1000 [USP'U]/ML
1000-10000 INJECTION, SOLUTION INTRAVENOUS; SUBCUTANEOUS EVERY 5 MIN PRN
Status: DISCONTINUED | OUTPATIENT
Start: 2018-10-04 | End: 2018-10-04 | Stop reason: HOSPADM

## 2018-10-04 RX ORDER — LIDOCAINE HYDROCHLORIDE 10 MG/ML
30 INJECTION, SOLUTION EPIDURAL; INFILTRATION; INTRACAUDAL; PERINEURAL
Status: DISCONTINUED | OUTPATIENT
Start: 2018-10-04 | End: 2018-10-04 | Stop reason: HOSPADM

## 2018-10-04 RX ORDER — HYDROCODONE BITARTRATE AND ACETAMINOPHEN 5; 325 MG/1; MG/1
1-2 TABLET ORAL EVERY 4 HOURS PRN
Status: DISCONTINUED | OUTPATIENT
Start: 2018-10-04 | End: 2018-10-06 | Stop reason: HOSPADM

## 2018-10-04 RX ORDER — ONDANSETRON 2 MG/ML
4 INJECTION INTRAMUSCULAR; INTRAVENOUS EVERY 6 HOURS PRN
Status: DISCONTINUED | OUTPATIENT
Start: 2018-10-04 | End: 2018-10-06 | Stop reason: HOSPADM

## 2018-10-04 RX ORDER — SODIUM CHLORIDE 9 MG/ML
INJECTION, SOLUTION INTRAVENOUS CONTINUOUS
Status: DISCONTINUED | OUTPATIENT
Start: 2018-10-04 | End: 2018-10-06

## 2018-10-04 RX ORDER — ATROPINE SULFATE 0.1 MG/ML
0.5 INJECTION INTRAVENOUS EVERY 5 MIN PRN
Status: ACTIVE | OUTPATIENT
Start: 2018-10-04 | End: 2018-10-05

## 2018-10-04 RX ORDER — LISINOPRIL 2.5 MG/1
2.5 TABLET ORAL DAILY
Status: DISCONTINUED | OUTPATIENT
Start: 2018-10-04 | End: 2018-10-06

## 2018-10-04 RX ORDER — ATORVASTATIN CALCIUM 40 MG/1
40 TABLET, FILM COATED ORAL DAILY
Status: DISCONTINUED | OUTPATIENT
Start: 2018-10-04 | End: 2018-10-06 | Stop reason: HOSPADM

## 2018-10-04 RX ORDER — NITROGLYCERIN 5 MG/ML
100-200 VIAL (ML) INTRAVENOUS
Status: DISCONTINUED | OUTPATIENT
Start: 2018-10-04 | End: 2018-10-04 | Stop reason: HOSPADM

## 2018-10-04 RX ORDER — NALOXONE HYDROCHLORIDE 0.4 MG/ML
.1-.4 INJECTION, SOLUTION INTRAMUSCULAR; INTRAVENOUS; SUBCUTANEOUS
Status: DISCONTINUED | OUTPATIENT
Start: 2018-10-04 | End: 2018-10-06 | Stop reason: HOSPADM

## 2018-10-04 RX ORDER — PRASUGREL 10 MG/1
10-60 TABLET, FILM COATED ORAL
Status: DISCONTINUED | OUTPATIENT
Start: 2018-10-04 | End: 2018-10-04 | Stop reason: HOSPADM

## 2018-10-04 RX ORDER — IOPAMIDOL 755 MG/ML
125 INJECTION, SOLUTION INTRAVASCULAR ONCE
Status: COMPLETED | OUTPATIENT
Start: 2018-10-04 | End: 2018-10-04

## 2018-10-04 RX ORDER — AMOXICILLIN 250 MG
1 CAPSULE ORAL 2 TIMES DAILY PRN
Status: DISCONTINUED | OUTPATIENT
Start: 2018-10-04 | End: 2018-10-06 | Stop reason: HOSPADM

## 2018-10-04 RX ORDER — NITROGLYCERIN 20 MG/100ML
.07-2 INJECTION INTRAVENOUS CONTINUOUS PRN
Status: DISCONTINUED | OUTPATIENT
Start: 2018-10-04 | End: 2018-10-04 | Stop reason: HOSPADM

## 2018-10-04 RX ORDER — FENTANYL CITRATE 50 UG/ML
25-50 INJECTION, SOLUTION INTRAMUSCULAR; INTRAVENOUS
Status: ACTIVE | OUTPATIENT
Start: 2018-10-04 | End: 2018-10-05

## 2018-10-04 RX ORDER — POTASSIUM CHLORIDE 7.45 MG/ML
10 INJECTION INTRAVENOUS
Status: DISCONTINUED | OUTPATIENT
Start: 2018-10-04 | End: 2018-10-04 | Stop reason: HOSPADM

## 2018-10-04 RX ORDER — AMOXICILLIN 250 MG
2 CAPSULE ORAL 2 TIMES DAILY PRN
Status: DISCONTINUED | OUTPATIENT
Start: 2018-10-04 | End: 2018-10-06 | Stop reason: HOSPADM

## 2018-10-04 RX ORDER — LIDOCAINE HYDROCHLORIDE 10 MG/ML
1-10 INJECTION, SOLUTION EPIDURAL; INFILTRATION; INTRACAUDAL; PERINEURAL
Status: DISCONTINUED | OUTPATIENT
Start: 2018-10-04 | End: 2018-10-04 | Stop reason: HOSPADM

## 2018-10-04 RX ADMIN — Medication 12.5 MG: at 20:40

## 2018-10-04 RX ADMIN — HEPARIN SODIUM 2000 UNITS: 1000 INJECTION, SOLUTION INTRAVENOUS; SUBCUTANEOUS at 18:40

## 2018-10-04 RX ADMIN — IOPAMIDOL 125 ML: 755 INJECTION, SOLUTION INTRAVASCULAR at 19:15

## 2018-10-04 RX ADMIN — LIDOCAINE HYDROCHLORIDE 10 ML: 10 INJECTION, SOLUTION EPIDURAL; INFILTRATION; INTRACAUDAL; PERINEURAL at 18:24

## 2018-10-04 RX ADMIN — ATORVASTATIN CALCIUM 40 MG: 40 TABLET, FILM COATED ORAL at 20:40

## 2018-10-04 RX ADMIN — LISINOPRIL 2.5 MG: 2.5 TABLET ORAL at 20:40

## 2018-10-04 RX ADMIN — ATROPINE SULFATE 1 MG: 0.1 INJECTION, SOLUTION ENDOTRACHEAL; INTRAMUSCULAR; INTRAVENOUS; SUBCUTANEOUS at 18:55

## 2018-10-04 RX ADMIN — SODIUM CHLORIDE: 9 INJECTION, SOLUTION INTRAVENOUS at 21:00

## 2018-10-04 RX ADMIN — HEPARIN SODIUM 2000 UNITS: 1000 INJECTION, SOLUTION INTRAVENOUS; SUBCUTANEOUS at 18:27

## 2018-10-04 RX ADMIN — MIDAZOLAM 2 MG: 1 INJECTION INTRAMUSCULAR; INTRAVENOUS at 19:09

## 2018-10-04 RX ADMIN — RANITIDINE 150 MG: 150 TABLET ORAL at 22:19

## 2018-10-04 RX ADMIN — FENTANYL CITRATE 100 MCG: 50 INJECTION, SOLUTION INTRAMUSCULAR; INTRAVENOUS at 19:08

## 2018-10-04 RX ADMIN — DOPAMINE HYDROCHLORIDE 5 MCG/KG/MIN: 160 INJECTION, SOLUTION INTRAVENOUS at 18:50

## 2018-10-04 RX ADMIN — Medication 100 MCG: at 18:48

## 2018-10-04 NOTE — IP AVS SNAPSHOT
MRN:[id number]                      After Visit Summary   10/4/2018    Hardy Landry    MRN: [id number]           Thank you!     Thank you for choosing [facility] for your care. Our goal is always to provide you with excellent care. Hearing back from our patients is one way we can continue to improve our services. Please take a few minutes to complete the written survey that you may receive in the mail after you visit with us. Thank you!        Patient Information     Date Of Birth          1950        Designated Caregiver       Most Recent Value    Caregiver    Will someone help with your care after discharge? yes    Name of designated caregiver wife    Phone number of caregiver same    Caregiver address same      About your hospital stay     You were admitted on:  October 4, 2018 You last received care in the:  [facility]    You were discharged on:  October 6, 2018       Who to Call     For medical emergencies, please call 911.  For non-urgent questions about your medical care, please call your primary care provider or clinic, [phone]          Attending Provider     Provider Specialty    Andrae Verma MD Cardiology    [facility], Bob Leger MD Internal Medicine       Primary Care Provider Office Phone # Fax #    Lamonte Leslie [phone]      After Care Instructions     Diet       Follow this diet upon discharge: low fat, low cholesterol diet                  Follow-up Appointments     Follow-up and recommended labs and tests        Follow up with cardiology as scheduled.                  Your next 10 appointments already scheduled     Oct 12, 2018  8:00 AM CDT   Return Discharge with Cari Thompson PA-C   [facility] ([facility])    [address]  [facility] [id number] [phone]            Oct 16, 2018 11:30 AM CDT   Cardiac Evaluation with [facility] [address] [place] [address]  [facility])    [address], [address]  [facility] [address] [phone]              Additional Services     CARDIAC REHAB REFERRAL       Patient may choose their preference of the site for Cardiac Rehab.                  Future tests that were ordered for you     Echocardiogram       Administration of IV contrast will be tailored to this examination per the appropriate written protocol listed in the Echocardiography department Protocol Book, or by the supervising Cardiologist. This may result in an order change.    Use of contrast is at the discretion of the supervising Cardiologist.                  Further instructions from your care team       A follow-up appointment was scheduled for you [see above].  It is very important to go to it--bring these papers and your insurance card with you.  At the visit, talk about your hospital stay.  Tell your doctor how you feel.  Show your new list of medications.  Your doctor will update records, make sure you are still doing OK, and decide if any tests or medication changes are needed.          Pending Results     No orders found from 10/2/2018 to 10/5/2018.            Statement of Approval     Ordered          10/06/18 1531  I have reviewed and agree with all the recommendations and orders detailed in this document.  EFFECTIVE NOW     Approved and electronically signed by:  Lilliana Chaves MD             Admission Information     Date & Time Provider Department Dept. Phone    10/4/2018 Bob Aldrich MD [facility] [phone]      Your Vitals Were     Blood Pressure Pulse Temperature Respirations Weight Pulse Oximetry    127/72 (BP Location: Right arm) 60 97.6  F (36.4  C) (Oral) 16 80.5 kg (177 lb 8 oz) 100%    BMI (Body Mass Index)                   28.65 kg/m2           MyChart Information     flaveit lets you send messages to your doctor, view your test results, renew your  "prescriptions, schedule appointments and more. To sign up, go to [email or web address] . Click on \"Log in\" on the left side of the screen, which will take you to the Welcome page. Then click on \"Sign up Now\" on the right side of the page.     You will be asked to enter the access code listed below, as well as some personal information. Please follow the directions to create your username and password.     Your access code is: [email or web address]  Expires: 2018  9:59 AM     Your access code will  in 90 days. If you need help or a new code, please call your [facility] clinic or [phone].        Care EveryWhere ID     This is your Care EveryWhere ID. This could be used by other organizations to access your [facility] medical records  [id number]        Equal Access to Services     CANELO JACQUES : Jese Basurto, raghu dahl, марина pate, janessa sheridan . So [facility] [phone].    ATENCIÓN: Si habla español, tiene a land disposición servicios gratuitos de asistencia lingüística. Llame al [phone].    We comply with applicable federal civil rights laws and [place] laws. We do not discriminate on the basis of race, color, national origin, age, disability, sex, sexual orientation, or gender identity.               Review of your medicines      START taking        Dose / Directions    atorvastatin 40 MG tablet   Commonly known as:  LIPITOR   Used for:  ST elevation myocardial infarction involving right coronary artery (H)        Dose:  40 mg   Take 1 tablet (40 mg) by mouth every evening   Quantity:  30 tablet   Refills:  0       lisinopril 5 MG tablet   Commonly known as:  PRINIVIL/ZESTRIL   Used for:  ST elevation myocardial infarction involving right coronary artery (H)        Dose:  5 mg   Start taking on:  10/7/2018   Take 1 tablet (5 mg) by mouth daily   Quantity:  30 tablet   Refills:  0       metoprolol succinate 25 MG 24 hr tablet   Commonly known " as:  TOPROL-XL   Used for:  ST elevation myocardial infarction involving right coronary artery (H)        Dose:  12.5 mg   Start taking on:  10/7/2018   Take 0.5 tablets (12.5 mg) by mouth daily   Quantity:  30 tablet   Refills:  0       nitroGLYcerin 0.4 MG sublingual tablet   Commonly known as:  NITROSTAT   Used for:  ST elevation myocardial infarction involving right coronary artery (H)        For chest pain place 1 tablet under the tongue every 5 minutes for 3 doses. If symptoms persist 5 minutes after 1st dose call 911.   Quantity:  25 tablet   Refills:  0       spironolactone 25 MG tablet   Commonly known as:  ALDACTONE   Used for:  ST elevation myocardial infarction involving right coronary artery (H)        Dose:  12.5 mg   Start taking on:  10/7/2018   Take 0.5 tablets (12.5 mg) by mouth daily   Quantity:  30 tablet   Refills:  0       ticagrelor 90 MG tablet   Commonly known as:  BRILINTA   Used for:  ST elevation myocardial infarction involving right coronary artery (H)   Notes to Patient:  Take twice a day  **12hrs apart**    Do not stop this med along with aspirin unless you are directed by MD        Dose:  90 mg   Take 1 tablet (90 mg) by mouth every 12 hours   Quantity:  60 tablet   Refills:  0         CONTINUE these medicines which have NOT CHANGED        Dose / Directions    aspirin 81 MG tablet        Dose:  81 mg   Take 81 mg by mouth daily.   Refills:  0       OMEPRAZOLE PO        Dose:  20 mg   Take 20 mg by mouth 2 times daily (before meals)   Refills:  0       ranitidine 150 MG tablet   Commonly known as:  ZANTAC        Dose:  150 mg   Take 150 mg by mouth 2 times daily   Refills:  0         STOP taking     simvastatin 40 MG tablet   Commonly known as:  ZOCOR                Where to get your medicines      These medications were sent to [facility] - [address]  [address], [place] [address]     Phone:  [phone]     atorvastatin 40 MG tablet     lisinopril 5 MG tablet    metoprolol succinate 25 MG 24 hr tablet    nitroGLYcerin 0.4 MG sublingual tablet    spironolactone 25 MG tablet    ticagrelor 90 MG tablet                Protect others around you: Learn how to safely use, store and throw away your medicines at www.disposemymeds.org.             Medication List: This is a list of all your medications and when to take them. Check marks below indicate your daily home schedule. Keep this list as a reference.      Medications           Morning Afternoon Evening Bedtime As Needed    aspirin 81 MG tablet   Take 81 mg by mouth daily.   Next Dose Due:  10/7  AM                                   atorvastatin 40 MG tablet   Commonly known as:  LIPITOR   Take 1 tablet (40 mg) by mouth every evening   Last time this was given:  40 mg on 10/6/2018  8:18 AM   Next Dose Due:  10/7  PM                                   lisinopril 5 MG tablet   Commonly known as:  PRINIVIL/ZESTRIL   Take 1 tablet (5 mg) by mouth daily   Start taking on:  10/7/2018   Last time this was given:  5 mg on 10/6/2018  8:19 AM   Next Dose Due:  10/7  AM                                   metoprolol succinate 25 MG 24 hr tablet   Commonly known as:  TOPROL-XL   Take 0.5 tablets (12.5 mg) by mouth daily   Start taking on:  10/7/2018   Last time this was given:  12.5 mg on 10/6/2018  8:18 AM   Next Dose Due:  10/7  AM                                   nitroGLYcerin 0.4 MG sublingual tablet   Commonly known as:  NITROSTAT   For chest pain place 1 tablet under the tongue every 5 minutes for 3 doses. If symptoms persist 5 minutes after 1st dose call 911.                                   OMEPRAZOLE PO   Take 20 mg by mouth 2 times daily (before meals)   Last time this was given:  20 mg on 10/6/2018  8:17 AM   Next Dose Due:  10/6  EVENING                                      ranitidine 150 MG tablet   Commonly known as:  ZANTAC   Take 150 mg by mouth 2 times daily   Last time this was given:  150 mg  on 10/6/2018  8:18 AM   Next Dose Due:  10/6  PM                                      spironolactone 25 MG tablet   Commonly known as:  ALDACTONE   Take 0.5 tablets (12.5 mg) by mouth daily   Start taking on:  10/7/2018   Last time this was given:  12.5 mg on 10/6/2018  8:19 AM   Next Dose Due:  10/7  AM                                   ticagrelor 90 MG tablet   Commonly known as:  BRILINTA   Take 1 tablet (90 mg) by mouth every 12 hours   Last time this was given:  90 mg on 10/6/2018  6:12 AM   Next Dose Due:  10/6  6PM   Notes to Patient:  Take twice a day  **12hrs apart**    Do not stop this med along with aspirin unless you are directed by MD                                                More Information        Patient Education    Aspirin Chewable tablet    Aspirin Chewing-gum, medicated    Aspirin Gastro-resistant tablet    Aspirin Oral tablet    Aspirin Oral tablet, extended-release    Aspirin Rectal suppository  Aspirin Oral tablet  What is this medicine?  ASPIRIN (AS pir in) is a pain reliever. It is used to treat mild pain and fever. This medicine is also used as directed by a doctor to prevent and to treat heart attacks, to prevent strokes, and to treat arthritis or inflammation.  This medicine may be used for other purposes; ask your health care provider or pharmacist if you have questions.  What should I tell my health care provider before I take this medicine?  They need to know if you have any of these conditions:    anemia    asthma    bleeding problems    child with chickenpox, the flu, or other viral infection    diabetes    gout    if you frequently drink alcohol containing drinks    kidney disease    liver disease    low level of vitamin K    lupus    smoke tobacco    stomach ulcers or other problems    an unusual or allergic reaction to aspirin, tartrazine dye, other medicines, dyes, or preservatives    pregnant or trying to get pregnant    breast-feeding  How should I use this medicine?  Take  this medicine by mouth with a glass of water. Follow the directions on the package or prescription label. You can take this medicine with or without food. If it upsets your stomach, take it with food. Do not take your medicine more often than directed.  Talk to your pediatrician regarding the use of this medicine in children. While this drug may be prescribed for children as young as 12 years of age for selected conditions, precautions do apply. Children and teenagers should not use this medicine to treat chicken pox or flu symptoms unless directed by a doctor.  Patients over 65 years old may have a stronger reaction and need a smaller dose.  Overdosage: If you think you have taken too much of this medicine contact a poison control center or emergency room at once.  NOTE: This medicine is only for you. Do not share this medicine with others.  What if I miss a dose?  If you are taking this medicine on a regular schedule and miss a dose, take it as soon as you can. If it is almost time for your next dose, take only that dose. Do not take double or extra doses.  What may interact with this medicine?  Do not take this medicine with any of the following medications:    cidofovir    ketorolac    probenecid  This medicine may also interact with the following medications:    alcohol    alendronate    bismuth subsalicylate    flavocoxid    herbal supplements like feverfew, garlic, aylin, ginkgo biloba, horse chestnut    medicines for diabetes or glaucoma like acetazolamide, methazolamide    medicines for gout    medicines that treat or prevent blood clots like enoxaparin, heparin, ticlopidine, warfarin    other aspirin and aspirin-like medicines    NSAIDs, medicines for pain and inflammation, like ibuprofen or naproxen    pemetrexed    sulfinpyrazone    varicella live vaccine  This list may not describe all possible interactions. Give your health care provider a list of all the medicines, herbs, non-prescription drugs, or  dietary supplements you use. Also tell them if you smoke, drink alcohol, or use illegal drugs. Some items may interact with your medicine.  What should I watch for while using this medicine?  If you are treating yourself for pain, tell your doctor or health care professional if the pain lasts more than 10 days, if it gets worse, or if there is a new or different kind of pain. Tell your doctor if you see redness or swelling. Also, check with your doctor if you have a fever that lasts for more than 3 days. Only take this medicine to prevent heart attacks or blood clotting if prescribed by your doctor or health care professional.  Do not take aspirin or aspirin-like medicines with this medicine. Too much aspirin can be dangerous. Always read the labels carefully.  This medicine can irritate your stomach or cause bleeding problems. Do not smoke cigarettes or drink alcohol while taking this medicine. Do not lie down for 30 minutes after taking this medicine to prevent irritation to your throat.  If you are scheduled for any medical or dental procedure, tell your healthcare provider that you are taking this medicine. You may need to stop taking this medicine before the procedure.  This medicine may be used to treat migraines. If you take migraine medicines for 10 or more days a month, your migraines may get worse. Keep a diary of headache days and medicine use. Contact your healthcare professional if your migraine attacks occur more frequently.  What side effects may I notice from receiving this medicine?  Side effects that you should report to your doctor or health care professional as soon as possible:    allergic reactions like skin rash, itching or hives, swelling of the face, lips, or tongue    breathing problems    changes in hearing, ringing in the ears    confusion    general ill feeling or flu-like symptoms    pain on swallowing    redness, blistering, peeling or loosening of the skin, including inside the mouth  or nose    signs and symptoms of bleeding such as bloody or black, tarry stools; red or dark-brown urine; spitting up blood or brown material that looks like coffee grounds; red spots on the skin; unusual bruising or bleeding from the eye, gums, or nose    trouble passing urine or change in the amount of urine    unusually weak or tired    yellowing of the eyes or skin  Side effects that usually do not require medical attention (report to your doctor or health care professional if they continue or are bothersome):    diarrhea or constipation    headache    nausea, vomiting    stomach gas, heartburn  This list may not describe all possible side effects. Call your doctor for medical advice about side effects. You may report side effects to FDA at 2-863-XOH-7287.  Where should I keep my medicine?  Keep out of the reach of children.  Store at room temperature between 15 and 30 degrees C (59 and 86 degrees F). Protect from heat and moisture. Do not use this medicine if it has a strong vinegar smell. Throw away any unused medicine after the expiration date.  NOTE:This sheet is a summary. It may not cover all possible information. If you have questions about this medicine, talk to your doctor, pharmacist, or health care provider. Copyright  2016 [employer] Standard                Metoprolol tablets  Brand Name: Lopressor  What is this medicine?  METOPROLOL (me TOE proe lole) is a beta-blocker. Beta-blockers reduce the workload on the heart and help it to beat more regularly. This medicine is used to treat high blood pressure and to prevent chest pain. It is also used to after a heart attack and to prevent an additional heart attack from occurring.  How should I use this medicine?  Take this medicine by mouth with a drink of water. Follow the directions on the prescription label. Take this medicine immediately after meals. Take your doses at regular intervals. Do not take more medicine than directed. Do not stop taking this  medicine suddenly. This could lead to serious heart-related effects.  Talk to your pediatrician regarding the use of this medicine in children. Special care may be needed.  What side effects may I notice from receiving this medicine?  Side effects that you should report to your doctor or health care professional as soon as possible:    allergic reactions like skin rash, itching or hives    cold or numb hands or feet    depression    difficulty breathing    faint    fever with sore throat    irregular heartbeat, chest pain    rapid weight gain    swollen legs or ankles  Side effects that usually do not require medical attention (report to your doctor or health care professional if they continue or are bothersome):    anxiety or nervousness    change in sex drive or performance    dry skin    headache    nightmares or trouble sleeping    short term memory loss    stomach upset or diarrhea    unusually tired  What may interact with this medicine?  This medicine may interact with the following medications:    certain medicines for blood pressure, heart disease, irregular heart beat    certain medicines for depression like monoamine oxidase (MAO) inhibitors, fluoxetine, or paroxetine    clonidine    dobutamine    epinephrine    isoproterenol    reserpine  What if I miss a dose?  If you miss a dose, take it as soon as you can. If it is almost time for your next dose, take only that dose. Do not take double or extra doses.  Where should I keep my medicine?  Keep out of the reach of children.  Store at room temperature between 15 and 30 degrees C (59 and 86 degrees F). Throw away any unused medicine after the expiration date.  What should I tell my health care provider before I take this medicine?  They need to know if you have any of these conditions:    diabetes    heart or vessel disease like slow heart rate, worsening heart failure, heart block, sick sinus syndrome or Raynaud's disease    kidney disease    liver  disease    lung or breathing disease, like asthma or emphysema    pheochromocytoma    thyroid disease    an unusual or allergic reaction to metoprolol, other beta-blockers, medicines, foods, dyes, or preservatives    pregnant or trying to get pregnant    breast-feeding  What should I watch for while using this medicine?  Visit your doctor or health care professional for regular check ups. Contact your doctor right away if your symptoms worsen. Check your blood pressure and pulse rate regularly. Ask your health care professional what your blood pressure and pulse rate should be, and when you should contact them.  You may get drowsy or dizzy. Do not drive, use machinery, or do anything that needs mental alertness until you know how this medicine affects you. Do not sit or stand up quickly, especially if you are an older patient. This reduces the risk of dizzy or fainting spells. Contact your doctor if these symptoms continue. Alcohol may interfere with the effect of this medicine. Avoid alcoholic drinks.  NOTE:This sheet is a summary. It may not cover all possible information. If you have questions about this medicine, talk to your doctor, pharmacist, or health care provider. Copyright  2018 [employer]                Ticagrelor Oral tablet  What is this medicine?  TICAGRELOR ([place] ka GREL or) helps to prevent blood clots. This medicine is used to prevent heart attack, stroke, or other vascular events in people who have had a recent heart attack or who have severe chest pain.  This medicine may be used for other purposes; ask your health care provider or pharmacist if you have questions.  What should I tell my health care provider before I take this medicine?  They need to know if you have any of these conditions:    bleeding disorder    bleeding in the brain    liver disease    planned surgery    stomach or intestinal ulcers    stroke or transient ischemic attack    an unusual or allergic reaction to ticagrelor, other  medicines, foods, dyes, or preservatives    pregnant or trying to get pregnant    breast-feeding  How should I use this medicine?  Take this medicine by mouth with a glass of water. Follow the directions on the prescription label. You can take it with or without food. If it upsets your stomach, take it with food. Take your medicine at regular intervals. Do not take it more often than directed. Do not stop taking except on your doctor's advice.  Talk to you pediatrician regarding the use of this medicine in children. Special care may be needed.  Overdosage: If you think you've taken too much of this medicine contact a poison control center or emergency room at once.  NOTE: This medicine is only for you. Do not share this medicine with others.  What if I miss a dose?  If you miss a dose, take it as soon as you can. If it is almost time for your next dose, take only that dose. Do not take double or extra doses.  What may interact with this medicine?    certain antibiotics like clarithromycin and telithromycin    certain medicines for fungal infections like itraconazole, ketoconazole, and voriconazole    certain medicines for HIV infection like atazanavir, indinavir, nelfinavir, ritonavir, and saquinavir    certain medicines for seizures like carbamazepine, phenobarbital, and phenytoin    certain medicines that treat or prevent blood clots like warfarin    dexamethasone    digoxin    lovastatin    nefazodone    rifampin    simvastatin  This list may not describe all possible interactions. Give your health care provider a list of all the medicines, herbs, non-prescription drugs, or dietary supplements you use. Also tell them if you smoke, drink alcohol, or use illegal drugs. Some items may interact with your medicine.  What should I watch for while using this medicine?  Visit your doctor or health care professional for regular check ups. Do not stop taking you medicine unless your doctor tells you to.  Notify your doctor  or health care professional and seek emergency treatment if you develop breathing problems; changes in vision; chest pain; severe, sudden headache; pain, swelling, warmth in the leg; trouble speaking; sudden numbness or weakness of the face, arm, or leg. These can be signs that your condition has gotten worse.  If you are going to have surgery or dental work, tell your doctor or health care professional that you are taking this medicine.  You should take aspirin every day with this medicine. Do not take more than 100 mg each day. Talk to your doctor if you have questions.  What side effects may I notice from receiving this medicine?  Side effects that you should report to your doctor or health care professional as soon as possible:    allergic reactions like skin rash, itching or hives, swelling of the face, lips, or tongue    breathing problems    fast or irregular heartbeat    feeling faint or light-headed, falls    signs and symptoms of bleeding such as bloody or black, tarry stools; red or dark-brown urine; spitting up blood or brown material that looks like coffee grounds; red spots on the skin; unusual bruising or bleeding from the eye, gums, or nose  Side effects that usually do not require medical attention (Report these to your doctor or health care professional if they continue or are bothersome.):    breast enlargement in both males and females    diarrhea    dizziness    headache    tiredness    upset stomach  This list may not describe all possible side effects. Call your doctor for medical advice about side effects. You may report side effects to FDA at 5-051-FDA-6205.  Where should I keep my medicine?  Keep out of the reach of children.  Store at room temperature of 59 to 86 degrees F (15 to 30 degrees C). Throw away any unused medicine after the expiration date.  NOTE: This sheet is a summary. It may not cover all possible information. If you have questions about this medicine, talk to your doctor,  pharmacist, or health care provider.  NOTE:This sheet is a summary. It may not cover all possible information. If you have questions about this medicine, talk to your doctor, pharmacist, or health care provider. Copyright  2016 Gold Standard                Patient Education    Nitroglycerin Oral capsule, extended-release    Nitroglycerin Oral tablet, extended-release    Nitroglycerin Rectal ointment    Nitroglycerin Solution for injection    Nitroglycerin Sublingual tablet    Nitroglycerin Sublingual/Translingual spray    Nitroglycerin Topical ointment    Nitroglycerin Transdermal patch - 24 hour    Nitroglycerin, Dextrose Solution for injection  Nitroglycerin Sublingual tablet  What is this medicine?  NITROGLYCERIN (meka troe GLI ser in) is a type of vasodilator. It relaxes blood vessels, increasing the blood and oxygen supply to your heart. This medicine is used to relieve chest pain caused by angina. It is also used to prevent chest pain before activities like climbing stairs, going outdoors in cold weather, or sexual activity.  This medicine may be used for other purposes; ask your health care provider or pharmacist if you have questions.  What should I tell my health care provider before I take this medicine?  They need to know if you have any of these conditions:    anemia    head injury, recent stroke, or bleeding in the brain    liver disease    previous heart attack    an unusual or allergic reaction to nitroglycerin, other medicines, foods, dyes, or preservatives    pregnant or trying to get pregnant    breast-feeding  How should I use this medicine?  Take this medicine by mouth as needed. At the first sign of an angina attack (chest pain or tightness) place one tablet under your tongue. You can also take this medicine 5 to 10 minutes before an event likely to produce chest pain. Follow the directions on the prescription label. Let the tablet dissolve under the tongue. Do not swallow whole. Replace the dose  if you accidentally swallow it. It will help if your mouth is not dry. Saliva around the tablet will help it to dissolve more quickly. Do not eat or drink, smoke or chew tobacco while a tablet is dissolving. If you are not better within 5 minutes after taking ONE dose of nitroglycerin, call 9-1-1 immediately to seek emergency medical care. Do not take more than 3 nitroglycerin tablets over 15 minutes.  If you take this medicine often to relieve symptoms of angina, your doctor or health care professional may provide you with different instructions to manage your symptoms. If symptoms do not go away after following these instructions, it is important to call 9-1-1 immediately. Do not take more than 3 nitroglycerin tablets over 15 minutes.  Talk to your pediatrician regarding the use of this medicine in children. Special care may be needed.  Overdosage: If you think you have taken too much of this medicine contact a poison control center or emergency room at once.  NOTE: This medicine is only for you. Do not share this medicine with others.  What if I miss a dose?  This does not apply. This medicine is only used as needed.  What may interact with this medicine?  Do not take this medicine with any of the following medications:    certain migraine medicines like ergotamine and dihydroergotamine (DHE)    medicines used to treat erectile dysfunction like sildenafil, tadalafil, and vardenafil    riociguat  This medicine may also interact with the following medications:    alteplase    aspirin    heparin    medicines for high blood pressure    medicines for mental depression    other medicines used to treat angina    phenothiazines like chlorpromazine, mesoridazine, prochlorperazine, thioridazine  This list may not describe all possible interactions. Give your health care provider a list of all the medicines, herbs, non-prescription drugs, or dietary supplements you use. Also tell them if you smoke, drink alcohol, or use  illegal drugs. Some items may interact with your medicine.  What should I watch for while using this medicine?  Tell your doctor or health care professional if you feel your medicine is no longer working.  Keep this medicine with you at all times. Sit or lie down when you take your medicine to prevent falling if you feel dizzy or faint after using it. Try to remain calm. This will help you to feel better faster. If you feel dizzy, take several deep breaths and lie down with your feet propped up, or bend forward with your head resting between your knees.  You may get drowsy or dizzy. Do not drive, use machinery, or do anything that needs mental alertness until you know how this drug affects you. Do not stand or sit up quickly, especially if you are an older patient. This reduces the risk of dizzy or fainting spells. Alcohol can make you more drowsy and dizzy. Avoid alcoholic drinks.  Do not treat yourself for coughs, colds, or pain while you are taking this medicine without asking your doctor or health care professional for advice. Some ingredients may increase your blood pressure.  What side effects may I notice from receiving this medicine?  Side effects that you should report to your doctor or health care professional as soon as possible:    blurred vision    dry mouth    skin rash    sweating    the feeling of extreme pressure in the head    unusually weak or tired  Side effects that usually do not require medical attention (report to your doctor or health care professional if they continue or are bothersome):    flushing of the face or neck    headache    irregular heartbeat, palpitations    nausea, vomiting  This list may not describe all possible side effects. Call your doctor for medical advice about side effects. You may report side effects to FDA at 9-157-FDA-9675.  Where should I keep my medicine?  Keep out of the reach of children.  Store at room temperature between 20 and 25 degrees C (68 and 77 degrees  F). Store in original container. Protect from light and moisture. Keep tightly closed. Throw away any unused medicine after the expiration date.  NOTE:This sheet is a summary. It may not cover all possible information. If you have questions about this medicine, talk to your doctor, pharmacist, or health care provider. Copyright  2016 Gold Standard                Spironolactone Oral tablet  What is this medicine?  SPIRONOLACTONE (keyonna on oh LAK tone) is a diuretic. It helps you make more urine and to lose excess water from your body. This medicine is used to treat high blood pressure, and edema or swelling from heart, kidney, or liver disease. It is also used to treat patients who make too much aldosterone or have low potassium.  This medicine may be used for other purposes; ask your health care provider or pharmacist if you have questions.  What should I tell my health care provider before I take this medicine?  They need to know if you have any of these conditions:    high blood level of potassium    kidney disease or trouble making urine    liver disease    an unusual or allergic reaction to spironolactone, other medicines, foods, dyes, or preservatives    pregnant or trying to get pregnant    breast-feeding  How should I use this medicine?  Take this medicine by mouth with a drink of water. Follow the directions on your prescription label. You can take it with or without food. If it upsets your stomach, take it with food. Do not take your medicine more often than directed. Remember that you will need to pass more urine after taking this medicine. Do not take your doses at a time of day that will cause you problems. Do not take at bedtime.  Talk to your pediatrician regarding the use of this medicine in children. While this drug may be prescribed for selected conditions, precautions do apply.  Overdosage: If you think you have taken too much of this medicine contact a poison control center or emergency room at  once.  NOTE: This medicine is only for you. Do not share this medicine with others.  What if I miss a dose?  If you miss a dose, take it as soon as you can. If it is almost time for your next dose, take only that dose. Do not take double or extra doses.  What may interact with this medicine?  Do not take this medicine with any of the following medications:    eplerenone  This medicine may also interact with the following medications:    corticosteroids    digoxin    lithium    medicines for high blood pressure like ACE inhibitors    skeletal muscle relaxants like tubocurarine    NSAIDs, medicines for pain and inflammation, like ibuprofen or naproxen    potassium products like salt substitute or supplements    pressor amines like norepinephrine    some diuretics  This list may not describe all possible interactions. Give your health care provider a list of all the medicines, herbs, non-prescription drugs, or dietary supplements you use. Also tell them if you smoke, drink alcohol, or use illegal drugs. Some items may interact with your medicine.  What should I watch for while using this medicine?  Visit your doctor or health care professional for regular checks on your progress. Check your blood pressure as directed. Ask your doctor what your blood pressure should be, and when you should contact them.  You may need to be on a special diet while taking this medicine. Ask your doctor. Also, ask how many glasses of fluid you need to drink a day. You must not get dehydrated.  This medicine may make you feel confused, dizzy or lightheaded. Drinking alcohol and taking some medicines can make this worse. Do not drive, use machinery, or do anything that needs mental alertness until you know how this medicine affects you. Do not sit or stand up quickly.  What side effects may I notice from receiving this medicine?  Side effects that you should report to your doctor or health care professional as soon as possible:    allergic  reactions such as skin rash or itching, hives, swelling of the lips, mouth, tongue, or throat    black or tarry stools    fast, irregular heartbeat    fever    muscle pain, cramps    numbness, tingling in hands or feet    trouble breathing    trouble passing urine    unusual bleeding    unusually weak or tired  Side effects that usually do not require medical attention (report to your doctor or health care professional if they continue or are bothersome):    change in voice or hair growth    confusion    dizzy, drowsy    dry mouth, increased thirst    enlarged or tender breasts    headache    irregular menstrual periods    sexual difficulty, unable to have an erection    stomach upset  This list may not describe all possible side effects. Call your doctor for medical advice about side effects. You may report side effects to FDA at 1-249-ZKA-9956.  Where should I keep my medicine?  Keep out of the reach of children.  Store below 25 degrees C (77 degrees F). Throw away any unused medicine after the expiration date.  NOTE:This sheet is a summary. It may not cover all possible information. If you have questions about this medicine, talk to your doctor, pharmacist, or health care provider. Copyright  2016 Gold Standard                Atorvastatin Calcium Oral tablet  What is this medicine?  ATORVASTATIN (a TORE va sta tin) is known as a HMG-CoA reductase inhibitor or 'statin'. It lowers the level of cholesterol and triglycerides in the blood. This drug may also reduce the risk of heart attack, stroke, or other health problems in patients with risk factors for heart disease. Diet and lifestyle changes are often used with this drug.  This medicine may be used for other purposes; ask your health care provider or pharmacist if you have questions.  What should I tell my health care provider before I take this medicine?  They need to know if you have any of these conditions:    frequently drink alcoholic beverages    history  of stroke, TIA    kidney disease    liver disease    muscle aches or weakness    other medical condition    an unusual or allergic reaction to atorvastatin, other medicines, foods, dyes, or preservatives    pregnant or trying to get pregnant    breast-feeding  How should I use this medicine?  Take this medicine by mouth with a glass of water. Follow the directions on the prescription label. You can take this medicine with or without food. Take your doses at regular intervals. Do not take your medicine more often than directed.  Talk to your pediatrician regarding the use of this medicine in children. While this drug may be prescribed for children as young as 10 years old for selected conditions, precautions do apply.  Overdosage: If you think you have taken too much of this medicine contact a poison control center or emergency room at once.  NOTE: This medicine is only for you. Do not share this medicine with others.  What if I miss a dose?  If you miss a dose, take it as soon as you can. If it is almost time for your next dose, take only that dose. Do not take double or extra doses.  What may interact with this medicine?  Do not take this medicine with any of the following medications:    red yeast rice    telaprevir    telithromycin    voriconazole  This medicine may also interact with the following medications:    alcohol    antiviral medicines for HIV or AIDS    boceprevir    certain antibiotics like clarithromycin, erythromycin, troleandomycin    certain medicines for cholesterol like fenofibrate or gemfibrozil    cimetidine    clarithromycin    colchicine    cyclosporine    digoxin    female hormones, like estrogens or progestins and birth control pills    grapefruit juice    medicines for fungal infections like fluconazole, itraconazole, ketoconazole    niacin    rifampin    spironolactone  This list may not describe all possible interactions. Give your health care provider a list of all the medicines, herbs,  non-prescription drugs, or dietary supplements you use. Also tell them if you smoke, drink alcohol, or use illegal drugs. Some items may interact with your medicine.  What should I watch for while using this medicine?  Visit your doctor or health care professional for regular check-ups. You may need regular tests to make sure your liver is working properly.  Tell your doctor or health care professional right away if you get any unexplained muscle pain, tenderness, or weakness, especially if you also have a fever and tiredness. Your doctor or health care professional may tell you to stop taking this medicine if you develop muscle problems. If your muscle problems do not go away after stopping this medicine, contact your health care professional.  This drug is only part of a total heart-health program. Your doctor or a dietician can suggest a low-cholesterol and low-fat diet to help. Avoid alcohol and smoking, and keep a proper exercise schedule.  Do not use this drug if you are pregnant or breast-feeding. Serious side effects to an unborn child or to an infant are possible. Talk to your doctor or pharmacist for more information.  This medicine may affect blood sugar levels. If you have diabetes, check with your doctor or health care professional before you change your diet or the dose of your diabetic medicine.  If you are going to have surgery tell your health care professional that you are taking this drug.  What side effects may I notice from receiving this medicine?  Side effects that you should report to your doctor or health care professional as soon as possible:    allergic reactions like skin rash, itching or hives, swelling of the face, lips, or tongue    dark urine    fever    joint pain    muscle cramps, pain    redness, blistering, peeling or loosening of the skin, including inside the mouth    trouble passing urine or change in the amount of urine    unusually weak or tired    yellowing of eyes or  skin  Side effects that usually do not require medical attention (report to your doctor or health care professional if they continue or are bothersome):    constipation    heartburn    stomach gas, pain, upset  This list may not describe all possible side effects. Call your doctor for medical advice about side effects. You may report side effects to FDA at 7-321-WJT-4157.  Where should I keep my medicine?  Keep out of the reach of children.  Store at room temperature between 20 to 25 degrees C (68 to 77 degrees F). Throw away any unused medicine after the expiration date.  NOTE:This sheet is a summary. It may not cover all possible information. If you have questions about this medicine, talk to your doctor, pharmacist, or health care provider. Copyright  2016 Gold Standard

## 2018-10-04 NOTE — IP AVS SNAPSHOT
[facility]    [address]    [facility] [address]    Phone:  [phone]                                       After Visit Summary   10/4/2018    Hardy Landry    MRN: [id number]           After Visit Summary Signature Page     I have received my discharge instructions, and my questions have been answered. I have discussed any challenges I see with this plan with the nurse or doctor.    ..........................................................................................................................................  Patient/Patient Representative Signature      ..........................................................................................................................................  Patient Representative Print Name and Relationship to Patient    ..................................................               ................................................  Date                                   Time    ..........................................................................................................................................  Reviewed by Signature/Title    ...................................................              ..............................................  Date                                               Time          22EPIC Rev 08/18

## 2018-10-05 ENCOUNTER — APPOINTMENT (OUTPATIENT)
Dept: PHYSICAL THERAPY | Facility: CLINIC | Age: 68
DRG: 247 | End: 2018-10-05
Attending: INTERNAL MEDICINE
Payer: COMMERCIAL

## 2018-10-05 ENCOUNTER — APPOINTMENT (OUTPATIENT)
Dept: CARDIOLOGY | Facility: CLINIC | Age: 68
DRG: 247 | End: 2018-10-05
Attending: INTERNAL MEDICINE
Payer: COMMERCIAL

## 2018-10-05 LAB
ANION GAP SERPL CALCULATED.3IONS-SCNC: 7 MMOL/L (ref 3–14)
BUN SERPL-MCNC: 14 MG/DL (ref 7–30)
CALCIUM SERPL-MCNC: 8.4 MG/DL (ref 8.5–10.1)
CHLORIDE SERPL-SCNC: 106 MMOL/L (ref 94–109)
CO2 SERPL-SCNC: 26 MMOL/L (ref 20–32)
CREAT SERPL-MCNC: 0.99 MG/DL (ref 0.66–1.25)
ERYTHROCYTE [DISTWIDTH] IN BLOOD BY AUTOMATED COUNT: 13 % (ref 10–15)
GFR SERPL CREATININE-BSD FRML MDRD: 75 ML/MIN/1.7M2
GLUCOSE SERPL-MCNC: 94 MG/DL (ref 70–99)
HCT VFR BLD AUTO: 38.2 % (ref 40–53)
HGB BLD-MCNC: 13.2 G/DL (ref 13.3–17.7)
MCH RBC QN AUTO: 31.7 PG (ref 26.5–33)
MCHC RBC AUTO-ENTMCNC: 34.6 G/DL (ref 31.5–36.5)
MCV RBC AUTO: 92 FL (ref 78–100)
PLATELET # BLD AUTO: 222 10E9/L (ref 150–450)
POTASSIUM SERPL-SCNC: 4.4 MMOL/L (ref 3.4–5.3)
RBC # BLD AUTO: 4.17 10E12/L (ref 4.4–5.9)
SODIUM SERPL-SCNC: 139 MMOL/L (ref 133–144)
TROPONIN I SERPL-MCNC: >200 UG/L (ref 0–0.04)
TROPONIN I SERPL-MCNC: >200 UG/L (ref 0–0.04)
WBC # BLD AUTO: 10.4 10E9/L (ref 4–11)

## 2018-10-05 PROCEDURE — 25000132 ZZH RX MED GY IP 250 OP 250 PS 637: Performed by: INTERNAL MEDICINE

## 2018-10-05 PROCEDURE — 97110 THERAPEUTIC EXERCISES: CPT | Mod: GP

## 2018-10-05 PROCEDURE — 21000000 ZZH R&B IMCU HEART CARE

## 2018-10-05 PROCEDURE — 84484 ASSAY OF TROPONIN QUANT: CPT | Performed by: NURSE PRACTITIONER

## 2018-10-05 PROCEDURE — 99233 SBSQ HOSP IP/OBS HIGH 50: CPT | Mod: 25 | Performed by: INTERNAL MEDICINE

## 2018-10-05 PROCEDURE — 93306 TTE W/DOPPLER COMPLETE: CPT | Mod: 26 | Performed by: INTERNAL MEDICINE

## 2018-10-05 PROCEDURE — 99232 SBSQ HOSP IP/OBS MODERATE 35: CPT | Performed by: INTERNAL MEDICINE

## 2018-10-05 PROCEDURE — 80048 BASIC METABOLIC PNL TOTAL CA: CPT | Performed by: INTERNAL MEDICINE

## 2018-10-05 PROCEDURE — 84484 ASSAY OF TROPONIN QUANT: CPT | Performed by: INTERNAL MEDICINE

## 2018-10-05 PROCEDURE — 40000193 ZZH STATISTIC PT WARD VISIT

## 2018-10-05 PROCEDURE — 36415 COLL VENOUS BLD VENIPUNCTURE: CPT | Performed by: INTERNAL MEDICINE

## 2018-10-05 PROCEDURE — 25500064 ZZH RX 255 OP 636: Performed by: INTERNAL MEDICINE

## 2018-10-05 PROCEDURE — 93306 TTE W/DOPPLER COMPLETE: CPT

## 2018-10-05 PROCEDURE — 97161 PT EVAL LOW COMPLEX 20 MIN: CPT | Mod: GP

## 2018-10-05 PROCEDURE — 85027 COMPLETE CBC AUTOMATED: CPT | Performed by: INTERNAL MEDICINE

## 2018-10-05 PROCEDURE — 36415 COLL VENOUS BLD VENIPUNCTURE: CPT | Performed by: NURSE PRACTITIONER

## 2018-10-05 RX ORDER — SPIRONOLACTONE 25 MG
12.5 TABLET ORAL DAILY
Status: DISCONTINUED | OUTPATIENT
Start: 2018-10-05 | End: 2018-10-06 | Stop reason: HOSPADM

## 2018-10-05 RX ADMIN — TICAGRELOR 90 MG: 90 TABLET ORAL at 17:46

## 2018-10-05 RX ADMIN — OMEPRAZOLE 20 MG: 20 CAPSULE, DELAYED RELEASE ORAL at 15:33

## 2018-10-05 RX ADMIN — ATORVASTATIN CALCIUM 40 MG: 40 TABLET, FILM COATED ORAL at 08:28

## 2018-10-05 RX ADMIN — RANITIDINE 150 MG: 150 TABLET ORAL at 20:32

## 2018-10-05 RX ADMIN — TICAGRELOR 90 MG: 90 TABLET ORAL at 07:07

## 2018-10-05 RX ADMIN — RANITIDINE 150 MG: 150 TABLET ORAL at 08:28

## 2018-10-05 RX ADMIN — ASPIRIN 81 MG: 81 TABLET, COATED ORAL at 08:24

## 2018-10-05 RX ADMIN — HUMAN ALBUMIN MICROSPHERES AND PERFLUTREN 9 ML: 10; .22 INJECTION, SOLUTION INTRAVENOUS at 15:30

## 2018-10-05 RX ADMIN — OMEPRAZOLE 20 MG: 20 CAPSULE, DELAYED RELEASE ORAL at 07:06

## 2018-10-05 RX ADMIN — Medication 12.5 MG: at 11:58

## 2018-10-05 NOTE — PROGRESS NOTES
[facility]  Hospitalist Progress Note  Date of Service (when I saw the patient): 10/05/2018    Jeet Shell MD  [facility]  Pager [phone]    Assessment & Plan   68 year old male with PMH of CAD with MI in 2010 s/p stent to circumflex, angioplasty to LAD, and stent to RCA, GERD, asthma, HLD, who presents with an inferior STEMI, urgent cath lab activation s/p BARBARA to RCA.     1.) Inferior STEMI s/p BARBARA to RCA  Follows Dr. Simon in Cardiology clinic. Hx of MI in 2010 s/p stent to circumflex. He had LAD disease which could not be stended, s/p POBA. He later also had stent placed to RCA. Repeat angiogram in 2012 with stable CAD. Patient presenting this admission with inferior STEMI, urgent cath lab activation noted occluded distal RCA distal to previous RCA stent. This was stented.  There as some post-procedural hypotension and patient was briefly on a dopamine drip however he was weaned off prior to transfer to CICU. Patient's groin stable post-cath. Post-cath EKG with minimal ST elevation in inferior leads  - Cardiology consulted   - Start metoprolol 12.5mg BID, lisinopril 2.5mg daily  - Ticagrelor 90mg BID                         - Tele, I&O, check weights  - NS @ 75ml/hr  - Echocardiogram ordered, not yet done today  - Continue PTA ASA  - Continue PTA Zocor 40mg daily     Clarke's Esophagus: PTA omeprazole BID and ranitidine BID     Jehovah's witnesses: Patient notes he does not want blood transfusions.     DVT Prophylaxis: Pneumatic Compression Devices  Code Status: Full Code     Disposition: BP is a little soft this afternoon, needs TTE and cardiac rehab, anticipate possible discharge in AM.    Interval History   Doing well, fells a little tired but no other complaints.  Wife present, had questions about tests which I answered and what next steps are.  No new questions today.    -Data reviewed today: I reviewed all new labs and imaging results over the last 24  hours. I personally reviewed no images or EKG's today.    Physical Exam   Temp: 97.4  F (36.3  C) Temp src: Axillary BP: 94/67 Pulse: 60 Heart Rate: 60 Resp: 16 SpO2: 98 % O2 Device: None (Room air) Oxygen Delivery: 2 LPM  Vitals:    10/05/18 0500   Weight: 81.5 kg (179 lb 11.2 oz)     Vital Signs with Ranges  Temp:  [97.4  F (36.3  C)-97.9  F (36.6  C)] 97.4  F (36.3  C)  Pulse:  [60-66] 60  Heart Rate:  [55-85] 60  Resp:  [12-17] 16  BP: ()/(56-95) 94/67  SpO2:  [98 %-100 %] 98 %  I/O last 3 completed shifts:  In: 600 [I.V.:600]  Out: 1350 [Urine:1350]    Constitutional: NAD, A+Ox4  Respiratory: CTA B, normal WOB  Cardiovascular: RRR, no murmur  GI: S, NT, ND, normal BS  Skin/Integumen: Dry, warm, no edema  Other:      Medications     Percutaneous Coronary Intervention orders placed (this is information for BPA alerting)       sodium chloride 75 mL/hr at 10/04/18 2100       aspirin  81 mg Oral Daily     atorvastatin  40 mg Oral Daily     lisinopril  2.5 mg Oral Daily     metoprolol succinate  12.5 mg Oral Daily     omeprazole (priLOSEC) CR capsule 20 mg  20 mg Oral BID AC     ranitidine  150 mg Oral BID     spironolactone  12.5 mg Oral Daily     ticagrelor  90 mg Oral Q12H       Data     Recent Labs  Lab 10/05/18  0520 10/04/18  2030   WBC 10.4 10.6   HGB 13.2* 14.5   MCV 92 92    211    138   POTASSIUM 4.4 4.3   CHLORIDE 106 104   CO2 26 26   BUN 14 14   CR 0.99 0.94   ANIONGAP 7 8   MANNIE 8.4* 8.4*   GLC 94 96   TROPI >200.000*  --        No results found for this or any previous visit (from the past 24 hour(s)).

## 2018-10-05 NOTE — PROGRESS NOTES
10/05/18 1112   Quick Adds   Type of Visit Initial PT Evaluation   Living Environment   Lives With spouse   Living Arrangements house   Home Accessibility stairs to enter home;stairs within home   Number of Stairs to Enter Home 3   Number of Stairs Within Home 12   Stair Railings at Home inside, present on right side   Transportation Available car;family or friend will provide   Living Environment Comment Pt lives with spouse, has supportive family   Self-Care   Dominant Hand right   Usual Activity Tolerance good   Current Activity Tolerance good   Regular Exercise no   Equipment Currently Used at Home none   Activity/Exercise/Self-Care Comment Pt reports he weight lifts but is not consistent with it, hunts, keeps active but does not do formal exercise.   Functional Level Prior   Ambulation 0-->independent   Transferring 0-->independent   Fall history within last six months no   General Information   Onset of Illness/Injury or Date of Surgery - Date 10/04/18   Referring Physician Bob Dyer MD   Patient/Family Goals Statement To get better   Pertinent History of Current Problem (include personal factors and/or comorbidities that impact the POC) Pt is 68 year old male adm on 10/4/18 with c/o feeling generally unwell, min c/o chest pain, emergently to cath lab: PCI with BARBARA placement mid RCA. Pt with history of MI 8 years ago with stents placed, completed cardiac rehab at that time.   Precautions/Limitations no known precautions/limitations   Heart Disease Risk Factors Lack of physical activity;Overweight;Family history;Medical history;Gender;Age   Cognitive Status Examination   Orientation orientation to person, place and time   Level of Consciousness alert   Pain Assessment   Patient Currently in Pain No   Integumentary/Edema   Integumentary/Edema no deficits were identifed   Posture    Posture Forward head position   Range of Motion (ROM)   ROM Comment B LE ROM WFL   Strength   Strength Comments B LE  "strength 5/5   Bed Mobility   Bed Mobility Comments Independent   Transfer Skills   Transfer Comments Independent   Gait   Gait Comments Independent   Balance   Balance Comments Good   Sensory Examination   Sensory Perception Comments Denies numbness/tingling   Coordination   Coordination no deficits were identified   General Therapy Interventions   Planned Therapy Interventions risk factor education;home program guidelines;progressive activity/exercise   Clinical Impression   Criteria for Skilled Therapeutic Intervention yes, treatment indicated   PT Diagnosis Impaired aerobic exercise capacity   Influenced by the following impairments Decreased activity tolerance   Functional limitations due to impairments Decreased ability to participate in daily tasks   Clinical Presentation Stable/Uncomplicated   Clinical Presentation Rationale Current status, [facility]   Clinical Decision Making (Complexity) Low complexity   Therapy Frequency` 2 times/day   Predicted Duration of Therapy Intervention (days/wks) 3 days   Anticipated Discharge Disposition Home with Outpatient Therapy   Risk & Benefits of therapy have been explained Yes   Patient, Family & other staff in agreement with plan of care Yes   [facility] TM \"6 Clicks\"   2016, Trustees of [facility], under license to [employer].  All rights reserved.   6 Clicks Short Forms Basic Mobility Inpatient Short Form   [facility]  \"6 Clicks\" V.2 Basic Mobility Inpatient Short Form   1. Turning from your back to your side while in a flat bed without using bedrails? 4 - None   2. Moving from lying on your back to sitting on the side of a flat bed without using bedrails? 4 - None   3. Moving to and from a bed to a chair (including a wheelchair)? 4 - None   4. Standing up from a chair using your arms (e.g., wheelchair, or bedside chair)? 4 - None   5. To walk in hospital room? 4 - None   6. Climbing 3-5 steps with a railing? 4 - None   Basic Mobility " Raw Score (Score out of 24.Lower scores equate to lower levels of function) 24   Total Evaluation Time   Total Evaluation Time (Minutes) 10

## 2018-10-05 NOTE — PLAN OF CARE
Problem: Patient Care Overview  Goal: Plan of Care/Patient Progress Review  Discharge Planner PT   Patient plan for discharge: Home  Current status: Pt admitted on 10/4/18 with c/o chest pain, emergently to cath lab: PCI with BARBARA placement mid RCA. At baseline pt is independent and active without use of assistive device, lives with spouse in multilevel home. During cardiac rehab session pt independent with all mobility, able to tolerate 11 mins on treadmill at speed 3.0mph denies any symptoms, vital signs stable.  Barriers to return to prior living situation: None  Recommendations for discharge: Home with outpatient cardiac rehab  Rationale for recommendations: Pt  Mobilizing well, would benefit from continued education and monitoring while increasing aerobic activity level.       Entered by: Katarina Rueda 10/05/2018 12:03 PM

## 2018-10-05 NOTE — H&P
Admitted:     10/04/2018      HISTORY OF PRESENT ILLNESS:  The patient is a 68-year-old gentleman with known coronary artery disease, status post previous lateral MI and stenting of the circumflex as well as angioplasty of the LAD, asthma, hyperlipidemia and GERD who presented to [facility] this afternoon with complaints of chest pain.  EKG in the Emergency Department demonstrated ST elevation in inferior leads.      Mr. Landry is a patient of our colleague, Dr. Simon, therefore requested that he transfer to [facility] for his STEMI care.  Cardiac Catheterization Lab subsequently activated and the patient was helicoptered in to catheterization lab.  He received heparin, ticagrelor and aspirin at the outside facility.      PAST MEDICAL HISTORY:   1.  Coronary artery disease, status post prior MI and PCI to the circumflex and RCA and PTA of the LAD.   2.  GERD.   3.  Hyperlipidemia   4.  Hypertension.      SOCIAL HISTORY:  He is a former smoker, quit many years ago.  Denies excessive alcohol use.      FAMILY HISTORY:  Positive for premature coronary artery disease.      REVIEW OF SYSTEMS:  Comprehensive review of systems was performed and essentially negative except what is mentioned in the HPI.      ALLERGIES:  NO KNOWN DRUG ALLERGIES.      HOME MEDICATIONS:   1.  Aspirin 81 mg daily.   2.  Omeprazole 20 mg daily.   3.  Zantac 150 mg daily.   4.  Zocor 40 mg daily.      PHYSICAL EXAMINATION:   GENERAL:  He is resting and appears to be in no acute distress.   NECK:  Jugular pressure is normal.  Carotid upstrokes brisk.   LUNGS:  Clear to auscultation bilaterally.   HEART:  Normal S1, S2, no murmurs, rubs or gallops.   ABDOMEN:  Soft, nontender.   EXTREMITIES:  Warm, no edema, cyanosis or clubbing.   SKIN:  No rashes or lesions.   NEUROLOGIC:  No focal deficits.   NECK:  2+ radial, 2+ femoral.   PSYCHIATRIC:  Normal mood and affect.      ASSESSMENT AND PLAN:   1.  Acute inferior  ST elevation myocardial infarction.   2.  Known coronary artery disease, status post prior multiple percutaneous coronary interventions.   3.  Hyperlipidemia.   4.  GERD.      The patient underwent emergent coronary angiogram which demonstrated thrombotically occluded mid RCA proximal to the previously implanted stent.  He underwent successful PCI with his single drug-eluting stent placement in the mid RCA overlapping the previous stents.      The patient was slightly hypotensive at time of the procedure and required brief administration of dopamine.  At the conclusion of the procedure, the patient was hemodynamically stable and chest pain-free.      He will be admitted to the cardiac CICU under the care of our hospitalist.  He will need to dual antiplatelet therapy for a minimum of 12 months.  We will continue the rest of his cardiac medications.  Echocardiogram in the morning as well as cardiac rehabilitation.         ZAFAR ROBLEDO MD             D: 10/05/2018   T: 10/05/2018   MT: ORION      Name:     RAE MORAES   MRN:      [id number]        Account:      [id number]   :      1950        Admitted:     10/04/2018                   Document: [id number]

## 2018-10-05 NOTE — CONSULTS
NUTRITION EDUCATION    REASON FOR ASSESSMENT:  Nutrition education on American Heart Association (AHA) Heart Healthy Diet.    NUTRITION HISTORY:  Information obtained from patient and patient's wife    -pt had a MI back in 2010 and recalls speaking with a nutritionist then about heart healthy eating  -both pt and wife shop and cook at home  -pt reports he's gotten away from his healthy breakfast of smoothies with greens, carrots, 1/2 banana, 1/2 orange, and berries the last couple of months and has been eating cold cereal like cheerios or rice checks with 1% milk.  He has a sweet roll once a month  -A typical lunch is a small sandwich on white bread with veggies on the side  -Dinner is often meat and potatoes with veggies.  Fried fish or grilled fish a couple of times a week, lots of venison steaks, chicken, and a holloway cheeseburger once a month.  -pt had scrambled eggs, 1/2 piece of [place] toast, decaf coffee for breakfast this morning    CURRENT DIET ORDER:  Regular diet    NUTRITION DIAGNOSIS:  No nutrition diagnosis identified at this time.    INTERVENTIONS:  Nutrition Prescription:    Recommended AHA Heart Healthy Diet    Implementation:     Nutrition Education (Content):  a) reviewed Heart Healthy Diet guidelines  b) provided heart healthy diet handout    Nutrition Education (Application):  a) Discussed current eating habits and recommended alternative food choices  b) Discussed using avocado oil instead of coconut oil and limiting the salt shaker    Anticipate good compliance    Diet Education - refer to Education flowsheet    Goals:    Patient verbalizes understanding of diet by asking appropriate questions and stating one change he will make to decrease his saturated fat intake.    All of the above goals met during education session    Follow Up/Monitoring:    Provided RD contact information for future questions

## 2018-10-05 NOTE — PLAN OF CARE
Problem: Patient Care Overview  Goal: Plan of Care/Patient Progress Review  CR: Attempted PM CR session, pt just starting to have echo performed at bedside. Pt did well with cardiac rehab session this AM, no symptoms during treadmill or hallway ambulation, please refer to AM note. Will reschedule follow up cardiac rehab session for 10/6/18.

## 2018-10-05 NOTE — PROGRESS NOTES
[facility]    Cardiology Progress Note  Dr. Simon patient    Date of Service: 10/05/2018   pt seen and examined by me, long discussion with pt/wife discussed mechanism of MI and their questions of chelation etc    Assessment & Plan   Hardy Landry is a 68 year old male with past medical history of CAD with inferolateral wall MI in 2010 s/p stent to circumflex, angioplasty to LAD(unable to advance stent due to calcification and tortuosity)and stent to RCA, GERD, asthma, HLD, who presents with chest pain and an inferior STEMI, urgent cath lab activation s/p BARBARA to RCA.    JEHOVAH WITNESS-REQUESTS NO BLOOD TRANSFUSIONS    1. STEMI  Hx of circ stent, POBA to LAD, RCA stent 2010 with repeat cath 2012 showing patency  % mid RCA lesion prior to previous stent-->  Circumflex 20-40%  LAD 30%  Echo pending  Trop not drawn--will check 2  EKG-inf st elevation with mild ST reciprocal depression in precordial leads  Dopamine briefly in cath lab   BP now 89 to 99  hgb 14.5-->13.2  Creat 0.99  EH 40-45% in lab/LVEDP 20  NSVT last pm, none this am  THIS IS A LARGE MI AND HE HAD INC LVEDP AND LOW BP AND NSVT HERE  Will try low dose aldactone for cardiac remodel and try bb, ace etc if bp allows. May need lifevest if concern for VT again.  Will echo to check ef but I believe angio shows <35-40  I would favor indefinite plavix after the 1 yr of brilinta seeing the 3vd and clot burden on this mi while on asa    PLAN:  Brilinta/asa  Hold ace if bp less than 100/metoprolol until BP 95 or above(was not on either prior to admission)  Rehab as able  Check troponins  Echocardiogram today    2. Dyslipidemia  LDL 58  HDL 64  Switched to Atorvastatin 40mg daily    3. JEHOVAH WITNESS-NO BLOOD TRANSFUSIONS    Pt seen and examined by me, all observations plans etc are mine time spent 40min    Interval History   Pain free--multiple questions about CAD, chelation, juicing  Review of Systems:  The Review of Systems is  negative other than noted in the HPI    Physical Exam   Temp: 97.4  F (36.3  C) Temp src: Axillary BP: (!) 89/66 Pulse: 66 Heart Rate: 66 Resp: 16 SpO2: 98 % O2 Device: None (Room air) Oxygen Delivery: 2 LPM  Vitals:    10/05/18 0500   Weight: 81.5 kg (179 lb 11.2 oz)     Vital Signs with Ranges  Temp:  [97.4  F (36.3  C)-97.9  F (36.6  C)] 97.4  F (36.3  C)  Pulse:  [66] 66  Heart Rate:  [55-85] 66  Resp:  [12-16] 16  BP: ()/(66-95) 89/66  SpO2:  [98 %-100 %] 98 %  I/O last 3 completed shifts:  In: 600 [I.V.:600]  Out: 1350 [Urine:1350]    Constitutional     alert and oriented, in no acute distress.     Skin     warm and dry to touch    ENT     no pallor or cyanosis    Neck    Supple, JVP normal, no carotid bruit    Chest     no tenderness to palpation     Lungs  clear to auscultation     Cardiac   VERY distant tones regular rhythm, S1 normal, S2 normal, No S3 ?S4, no murmurs, no rubs      Abdomen     abdomen soft, bowel sounds normoactive, no hepatosplenomegaly    Extremities and Back     no clubbing, cyanosis. No edema observed.  Right groin clean without hum or bruit; 1+ pedal pulses      Neurological     no gross motor deficits noted, affect appropriate, oriented to time, person and place.        Medications     Percutaneous Coronary Intervention orders placed (this is information for BPA alerting)       sodium chloride 75 mL/hr at 10/04/18 2100       aspirin  81 mg Oral Daily     atorvastatin  40 mg Oral Daily     lisinopril  2.5 mg Oral Daily     metoprolol succinate  12.5 mg Oral Daily     omeprazole (priLOSEC) CR capsule 20 mg  20 mg Oral BID AC     ranitidine  150 mg Oral BID     ticagrelor  90 mg Oral Q12H          Data:     ROUTINE IP LABS (Last four results)  BMP  Recent Labs  Lab 10/05/18  0520 10/04/18  2030    138   POTASSIUM 4.4 4.3   CHLORIDE 106 104   MANNIE 8.4* 8.4*   CO2 26 26   BUN 14 14   CR 0.99 0.94   GLC 94 96     CHOLESTEROL/HEPATIC  Recent Labs  Lab 10/04/18  2030   CHOL 129    TRIG 34   LDL 58   HDL 64     CBC  Recent Labs  Lab 10/05/18  0520 10/04/18  2030   WBC 10.4 10.6   RBC 4.17* 4.56   HGB 13.2* 14.5   HCT 38.2* 41.8   MCV 92 92   MCH 31.7 31.8   MCHC 34.6 34.7   RDW 13.0 12.8    211     TROP: No lab results found in last 7 days.   BNP:  No results for input(s): NTBNPI in the last 168 hours.  INRNo lab results found in last 7 days.  No results found for: TSH    EKG results:  Reviewed if available     Imaging:  No results found for this or any previous visit (from the past 24 hour(s)).  Telemetry:    NSVT last pm   Sinus rhythm today

## 2018-10-05 NOTE — PHARMACY-ANTICOAGULATION SERVICE
Admission medication history interview status for the 10/4/2018  admission is complete. See EPIC admission navigator for prior to admission medications     Medication history source reliability:Moderate    Actions taken by pharmacist (provider contacted, etc):called edgar     Additional medication history information not noted on PTA med list :  --- Pt stated he takes both omeprazole and zantac. He mentioned omeprazole, but did not recall the 2nd h2 blocker. Per edgar it is Zantac.    Medication reconciliation/reorder completed by provider prior to medication history? No    Time spent in this activity: 10 min    Prior to Admission medications    Medication Sig Last Dose Taking? Auth Provider   aspirin 81 MG tablet Take 81 mg by mouth daily.  Yes Reported, Patient   OMEPRAZOLE PO Take 20 mg by mouth 2 times daily (before meals)  10/4/2018 at am Yes Reported, Patient   ranitidine (ZANTAC) 150 MG tablet Take 150 mg by mouth 2 times daily 10/4/2018 at am Yes Unknown, Entered By History   simvastatin (ZOCOR) 40 MG tablet Take 1 tablet (40 mg) by mouth At Bedtime 10/3/2018 at Unknown time Yes Jeet Simon MD

## 2018-10-05 NOTE — H&P
"[facility]    History and Physical  Hospitalist       Date of Admission:  10/4/2018    Assessment & Plan   Hardy Landry is a 68 year old male with PMH of CAD with MI in 2010 s/p stent to circumflex, angioplasty to LAD, and stent to RCA, GERD, asthma, HLD, who presents with an inferior STEMI, urgent cath lab activation s/p BARBARA to RCA.    Inferior STEMI s/p BARBARA to RCA  Follows Dr. Simon in Cardiology clinic. Hx of MI in 2010 s/p stent to circumflex. He had LAD disease which could not be stended, s/p POBA. He later also had stent placed to RCA. Repeat angiogram in 2012 with stable CAD. Patient presenting this admission with inferior STEMI, urgent cath lab activation noted occluded distal RCA distal to previous RCA stent. This was stented.  There as some post-procedural hypotension and patient was briefly on a dopamine drip however he was weaned off prior to transfer to CICU. Patient's groin stable post-cath. Post-cath EKG with minimal ST elevation in inferior leads  - Cardiology consulted, appreciate recs   - Start metoprolol 12.5mg BID, lisinopril 2.5mg daily   - Ticagrelor 90mg BID   - Bedrest for 6 hours  - Tele, I&O, check weights  - NS @ 75ml/hr  - Echocardiogram ordered  - Continue PTA ASA  - Continue PTA Zocor 40mg daily    Clarke's Esophagus: PTA omeprazole BID and ranitidine BID    Jehovah's witnesses: Patient notes he does not want blood transfusions.    DVT Prophylaxis: Pneumatic Compression Devices  Code Status: Full Code    Disposition: Expected discharge in 2 days    Bob Aldrich MD    Primary Care Physician   CHRISTIN OLIVERA    Chief Complaint   Chest pain    History is obtained from the patient  Wife present at bedside    History of Present Illness   Hardy Landry is a 68 year old male who presents with chest pain. He reports feeling \"off\" for the past two days, possibly mildly dyspneic on exertion, possibly some left chest discomfort. His wife was feeling a bit off as well " possibly with a cold. About 45 minutes before presenting to the ED, patient developed fairly significant left sided chest pain at rest. Did not have nitroglycerin with him. He suspected that he might have a heart attack. He denies any nausea, vomiting, cough, headaches.    Following cath, patient feels improved. Having some discomfort over his left chest but much improved from prior.    Past Medical History    I have reviewed this patient's medical history and updated it with pertinent information if needed.   Past Medical History:   Diagnosis Date     Chest pain      Coronary artery disease     stents 2010, 2011     GERD without esophagitis      Hyperlipidemia      Impaired fasting glucose      Myocardial infarct, old      Old myocardial infarction 2010       Past Surgical History   I have reviewed this patient's surgical history and updated it with pertinent information if needed.  Past Surgical History:   Procedure Laterality Date     ANGIOGRAM  11/2012    All  percutaneous intervention sites   remain widely patent     HEART CATH, ANGIOPLASTY  10/2010    stent to circ      HEART CATH, ANGIOPLASTY  11/2010    PTCA  mid LAD      HEART CATH, ANGIOPLASTY  1/2011    PTCA & stent RCA        Prior to Admission Medications   Prior to Admission Medications   Prescriptions Last Dose Informant Patient Reported? Taking?   OMEPRAZOLE PO 10/4/2018 at am Self Yes Yes   Sig: Take 20 mg by mouth 2 times daily (before meals)    aspirin 81 MG tablet  Self Yes Yes   Sig: Take 81 mg by mouth daily.   ranitidine (ZANTAC) 150 MG tablet 10/4/2018 at am  Yes Yes   Sig: Take 150 mg by mouth 2 times daily   simvastatin (ZOCOR) 40 MG tablet 10/3/2018 at Unknown time Self No Yes   Sig: Take 1 tablet (40 mg) by mouth At Bedtime      Facility-Administered Medications: None     Allergies   No Known Allergies    Social History   I have reviewed this patient's social history and updated it with pertinent information if needed. Hardy Landry   reports that he has quit smoking. He has never used smokeless tobacco. He reports that he drinks alcohol.    Family History   I have reviewed this patient's family history and updated it with pertinent information if needed.   Family History   Problem Relation Age of Onset     Colon Cancer Father      Coronary Artery Disease Mother      Hypertension Brother      Colon Cancer Brother      Coronary Artery Disease Brother      Hernia Sister      No Known Problems Sister      No Known Problems Brother      accident        Review of Systems   The 10 point Review of Systems is negative other than noted in the HPI or here.     Physical Exam                      Vital Signs with Ranges     0 lbs 0 oz    Constitutional: Male in NAD  Eyes: PERRL, nonicteric, normal ocular movements  HEENT: Normocephalic, atraumatic, oral mucosa moist  Respiratory: CTAB, no wheezing or crackles  Cardiovascular: RRR, normal S1/2, no m/r/g  GI: No organomegaly, normoactive bowel sounds, nontender, nondistended  Vascular: Palpable peripheral pulses in upper and lower extremities, no lower extremity pitting edema, Right groin cath site no bleeding, nontender, not indurated  Skin: No rashes or scars  Musculoskeletal: Normal strength in UE and LE, moves all extremities  Neurologic: A&Ox3  Psychiatric: Appropriate affect and mood    Data   Data reviewed today:  I personally reviewed post-cath EKG  .  No lab results found in last 7 days.  BMP and CBC ordered    Imaging:  No results found for this or any previous visit (from the past 24 hour(s)).

## 2018-10-05 NOTE — PLAN OF CARE
Problem: Patient Care Overview  Goal: Plan of Care/Patient Progress Review  Outcome: Improving  Pt admitted after acute inferior STEMI with BARBARA to mid RCA. VSS and WNL for this pt. LS clear, tele SR and A&Ox4. Pt has been SBA for transfers. R Groin site is CDI, CMS intact and no bruit. Plan for discharge home pending cardiac rehab.  Will pass on and continue to monitor.

## 2018-10-05 NOTE — BRIEF OP NOTE
Acute inferior STEMI s/p successful PCI with BARBARA placement in the mid RCA    Plan:  1. Admit to CICU  2. DAPT for 12 months  3. Echo in AM  4. Risk factor control

## 2018-10-06 ENCOUNTER — APPOINTMENT (OUTPATIENT)
Dept: PHYSICAL THERAPY | Facility: CLINIC | Age: 68
DRG: 247 | End: 2018-10-06
Attending: INTERNAL MEDICINE
Payer: COMMERCIAL

## 2018-10-06 VITALS
OXYGEN SATURATION: 100 % | WEIGHT: 177.5 LBS | SYSTOLIC BLOOD PRESSURE: 127 MMHG | DIASTOLIC BLOOD PRESSURE: 72 MMHG | RESPIRATION RATE: 16 BRPM | BODY MASS INDEX: 28.65 KG/M2 | TEMPERATURE: 97.6 F | HEART RATE: 60 BPM

## 2018-10-06 LAB
ANION GAP SERPL CALCULATED.3IONS-SCNC: 5 MMOL/L (ref 3–14)
BUN SERPL-MCNC: 18 MG/DL (ref 7–30)
CALCIUM SERPL-MCNC: 8.3 MG/DL (ref 8.5–10.1)
CHLORIDE SERPL-SCNC: 108 MMOL/L (ref 94–109)
CO2 SERPL-SCNC: 28 MMOL/L (ref 20–32)
CREAT SERPL-MCNC: 1.12 MG/DL (ref 0.66–1.25)
GFR SERPL CREATININE-BSD FRML MDRD: 65 ML/MIN/1.7M2
GLUCOSE SERPL-MCNC: 91 MG/DL (ref 70–99)
INTERPRETATION ECG - MUSE: NORMAL
POTASSIUM SERPL-SCNC: 4.1 MMOL/L (ref 3.4–5.3)
SODIUM SERPL-SCNC: 141 MMOL/L (ref 133–144)

## 2018-10-06 PROCEDURE — 25000132 ZZH RX MED GY IP 250 OP 250 PS 637: Performed by: INTERNAL MEDICINE

## 2018-10-06 PROCEDURE — 99232 SBSQ HOSP IP/OBS MODERATE 35: CPT | Performed by: INTERNAL MEDICINE

## 2018-10-06 PROCEDURE — 97110 THERAPEUTIC EXERCISES: CPT | Mod: GP

## 2018-10-06 PROCEDURE — 40000193 ZZH STATISTIC PT WARD VISIT

## 2018-10-06 PROCEDURE — 25000132 ZZH RX MED GY IP 250 OP 250 PS 637: Performed by: NURSE PRACTITIONER

## 2018-10-06 PROCEDURE — 80048 BASIC METABOLIC PNL TOTAL CA: CPT | Performed by: INTERNAL MEDICINE

## 2018-10-06 PROCEDURE — 36415 COLL VENOUS BLD VENIPUNCTURE: CPT | Performed by: INTERNAL MEDICINE

## 2018-10-06 RX ORDER — LISINOPRIL 5 MG/1
5 TABLET ORAL DAILY
Status: DISCONTINUED | OUTPATIENT
Start: 2018-10-06 | End: 2018-10-06 | Stop reason: HOSPADM

## 2018-10-06 RX ORDER — LISINOPRIL 5 MG/1
5 TABLET ORAL DAILY
Qty: 30 TABLET | Refills: 0 | Status: SHIPPED | OUTPATIENT
Start: 2018-10-07 | End: 2018-11-05

## 2018-10-06 RX ORDER — SPIRONOLACTONE 25 MG/1
12.5 TABLET ORAL DAILY
Qty: 30 TABLET | Refills: 0 | Status: SHIPPED | OUTPATIENT
Start: 2018-10-07 | End: 2018-10-12

## 2018-10-06 RX ORDER — ATORVASTATIN CALCIUM 40 MG/1
40 TABLET, FILM COATED ORAL EVERY EVENING
Qty: 30 TABLET | Refills: 0 | Status: SHIPPED | OUTPATIENT
Start: 2018-10-06 | End: 2018-11-05

## 2018-10-06 RX ORDER — METOPROLOL SUCCINATE 25 MG/1
12.5 TABLET, EXTENDED RELEASE ORAL DAILY
Qty: 30 TABLET | Refills: 0 | Status: SHIPPED | OUTPATIENT
Start: 2018-10-07 | End: 2018-10-12

## 2018-10-06 RX ORDER — NITROGLYCERIN 0.4 MG/1
TABLET SUBLINGUAL
Qty: 25 TABLET | Refills: 0 | Status: SHIPPED | OUTPATIENT
Start: 2018-10-06

## 2018-10-06 RX ADMIN — RANITIDINE 150 MG: 150 TABLET ORAL at 08:18

## 2018-10-06 RX ADMIN — LISINOPRIL 5 MG: 5 TABLET ORAL at 08:19

## 2018-10-06 RX ADMIN — TICAGRELOR 90 MG: 90 TABLET ORAL at 06:12

## 2018-10-06 RX ADMIN — OMEPRAZOLE 20 MG: 20 CAPSULE, DELAYED RELEASE ORAL at 08:17

## 2018-10-06 RX ADMIN — ATORVASTATIN CALCIUM 40 MG: 40 TABLET, FILM COATED ORAL at 08:18

## 2018-10-06 RX ADMIN — Medication 12.5 MG: at 08:19

## 2018-10-06 RX ADMIN — Medication 12.5 MG: at 08:18

## 2018-10-06 RX ADMIN — ASPIRIN 81 MG: 81 TABLET, COATED ORAL at 08:19

## 2018-10-06 NOTE — PLAN OF CARE
Problem: Patient Care Overview  Goal: Plan of Care/Patient Progress Review  Outcome: Improving  Up in the room independent, no chest pain tolerating food. VSS. Right groin site is dry and intact.

## 2018-10-06 NOTE — PLAN OF CARE
Problem: Patient Care Overview  Goal: Plan of Care/Patient Progress Review  Pt up ad mane today, tolerating cardiac rehab.  Rt groin angioseal site dry, intact.  Awaiting discharge later this afternoon.

## 2018-10-06 NOTE — PROGRESS NOTES
[facility]    Hospitalist Progress Note      Assessment & Plan   Hardy Landry is a 68 year old male with PMH of CAD with MI in 2010 s/p stent to circumflex, angioplasty to LAD, and stent to RCA, GERD, asthma, HLD, who presents with an inferior STEMI, urgent cath lab activation s/p BARBARA to RCA.      Inferior STEMI s/p BARBARA to RCA  Ischemic cardiomyopathy with LV EF of 45%  Follows Dr. Simon in Cardiology clinic. Hx of MI in 2010 s/p stent to circumflex. He had LAD disease which could not be stended, s/p POBA. He later also had stent placed to RCA. Repeat angiogram in 2012 with stable CAD. Patient presenting this admission with inferior STEMI, urgent cath lab activation noted occluded distal RCA distal to previous RCA stent. This was stented.  There as some post-procedural hypotension and patient was briefly on a dopamine drip however he was weaned off prior to transfer to CICU. Patient's groin stable post-cath. Post-cath EKG with minimal ST elevation in inferior leads.   - TTE shows LVEF of 45%, RWMA  - Started metoprolol XL 12.5mg daily, lisinopril increased to 5mg daily  - Ticagrelor 90mg BID, ASA daily and Lipitor     - cardiac rehab, if tolerates afternoon rehab, likely discharge home  - cardiology following,, appreciate help                       Clarke's Esophagus: PTA omeprazole BID and ranitidine BID      Jehovah's witnesses: Patient notes he does not want blood transfusions.      DVT Prophylaxis: Pneumatic Compression Devices  Code Status: Full Code      Disposition: BP is a little soft this afternoon, needs TTE and cardiac rehab, anticipate possible discharge in AM.    DVT Prophylaxis: Ambulate every shift  Code Status: Prior    Disposition: Expected discharge later today if tolerates cardiac rehab and stable bp.    Lilliana Chaves MD  Text Page  (7am to 6pm)    Interval History   Denies chest pain or shortness of breath.   Tolerated cardiac rehab this morning.   Wife at bedside.  Questions answered.     -Data reviewed today: I reviewed all new labs and imaging results over the last 24 hours. I personally reviewed no images or EKG's today.    Physical Exam   Temp: 97.9  F (36.6  C) Temp src: Oral BP: (!) 126/97 Pulse: 60 Heart Rate: 114 Resp: 16 SpO2: 100 % O2 Device: None (Room air)    Vitals:    10/05/18 0500 10/06/18 0615   Weight: 81.5 kg (179 lb 11.2 oz) 80.5 kg (177 lb 8 oz)     Vital Signs with Ranges  Temp:  [97.9  F (36.6  C)-98.2  F (36.8  C)] 97.9  F (36.6  C)  Pulse:  [60] 60  Heart Rate:  [] 114  Resp:  [16] 16  BP: ()/(56-97) 126/97  SpO2:  [98 %-100 %] 100 %  I/O last 3 completed shifts:  In: 723 [P.O.:720; I.V.:3]  Out: -     Constitutional: Alert, awake and no apparent distress  Respiratory: Clear to auscultation bilaterally, no wheezing  Cardiovascular: regular rate and rhythm  GI: soft and non-tender  Skin/Integumen: warm and dry  Other:      Medications     Percutaneous Coronary Intervention orders placed (this is information for BPA alerting)         aspirin  81 mg Oral Daily     atorvastatin  40 mg Oral Daily     lisinopril  5 mg Oral Daily     metoprolol succinate  12.5 mg Oral Daily     omeprazole (priLOSEC) CR capsule 20 mg  20 mg Oral BID AC     ranitidine  150 mg Oral BID     spironolactone  12.5 mg Oral Daily     ticagrelor  90 mg Oral Q12H       Data     Recent Labs  Lab 10/06/18  0539 10/05/18  1308 10/05/18  0520 10/04/18  2030   WBC  --   --  10.4 10.6   HGB  --   --  13.2* 14.5   MCV  --   --  92 92   PLT  --   --  222 211     --  139 138   POTASSIUM 4.1  --  4.4 4.3   CHLORIDE 108  --  106 104   CO2 28  --  26 26   BUN 18  --  14 14   CR 1.12  --  0.99 0.94   ANIONGAP 5  --  7 8   MANNIE 8.3*  --  8.4* 8.4*   GLC 91  --  94 96   TROPI  --  >200.000* >200.000*  --        No results found for this or any previous visit (from the past 24 hour(s)).

## 2018-10-06 NOTE — PROGRESS NOTES
[facility]    Cardiology Progress Note     Assessment & Plan   68 year old male with PMH of CAD with MI in 2010 s/p stent to circumflex, angioplasty to LAD, and stent to RCA, GERD, asthma, HLD, who presents with an inferior STEMI, urgent cath lab activation s/p BARBARA to RCA.    1.) Inferior STEMI s/p BARBARA to RCA  2.) Ischemic cardiomyopathy, LVEF 45%  - Continue ASA and Ticagrelor  - Continue metoprolol 12.5mg BID  - Increase lisinopril to 5 mg daily,   - Continue aldactone 12.5 mg Q daily  - Continue atrovastatin 40 mg Q daily  - ensure cardiac rehab  - please ensure outpatient cardiology f/u in 1-2 weeks with preceding laboratories and ECG  - ok to dismiss patient to home    Hari Washington MD    Interval History   No acute overnight events.  Patient is without complaints this morning.     Physical Exam   Temp: 97.9  F (36.6  C) Temp src: Oral BP: 99/62 Pulse: 60 Heart Rate: 62 Resp: 16 SpO2: 100 % O2 Device: None (Room air)    Vitals:    10/05/18 0500 10/06/18 0615   Weight: 81.5 kg (179 lb 11.2 oz) 80.5 kg (177 lb 8 oz)     Vital Signs with Ranges  Temp:  [97.4  F (36.3  C)-98.2  F (36.8  C)] 97.9  F (36.6  C)  Pulse:  [60-66] 60  Heart Rate:  [58-84] 62  Resp:  [16-17] 16  BP: ()/(56-71) 99/62  SpO2:  [98 %-100 %] 100 %  I/O last 3 completed shifts:  In: 723 [P.O.:720; I.V.:3]  Out: -   Patient Active Problem List   Diagnosis     Coronary artery disease     Hyperlipidemia     Chest pain     Old myocardial infarction     GERD without esophagitis     Myocardial infarct, old     Impaired fasting glucose     STEMI involving right coronary artery (H)     STEMI (ST elevation myocardial infarction) (H)       General:  Well-appearing, appears as stated age, friendly, sensorium clear, cognition intact, friendly, cooperative.  Vessels:  Nonelevated JVP.   Heart:  Normal S1, split S2.  No murmurs appreciated.    Lungs:  Clear to auscultation bilaterally.  No wheezes, rales, rhonchi.  Adequate air  movement.  Breathing comfortably.  Abdomen:  Soft, nontender, nondistended.   Extremities:  No edema.  Normal perfusion.  Groin: access site appears normal    Medications     Percutaneous Coronary Intervention orders placed (this is information for BPA alerting)       sodium chloride 75 mL/hr at 10/04/18 2100       aspirin  81 mg Oral Daily     atorvastatin  40 mg Oral Daily     lisinopril  2.5 mg Oral Daily     metoprolol succinate  12.5 mg Oral Daily     omeprazole (priLOSEC) CR capsule 20 mg  20 mg Oral BID AC     ranitidine  150 mg Oral BID     spironolactone  12.5 mg Oral Daily     ticagrelor  90 mg Oral Q12H       Data   Results for orders placed or performed during the hospital encounter of 10/04/18 (from the past 24 hour(s))   Troponin I   Result Value Ref Range    Troponin I ES >200.000 () 0.000 - 0.045 ug/L   ECHO COMPLETE WITH OPTISON    Narrative    [id number]  [id number]  [id number]  [id number]^VENANCIO^JOSE CRUZ^ALANNAANN           [facility]  Echocardiography Laboratory  [address] [place] [address]        Name: RAE MORAES  MRN: [id number]  : 1950  Study Date: 10/05/2018 02:54 PM  Age: 68 yrs  Gender: Male  Patient Location: [facility]  Reason For Study: MI - Acute  Ordering Physician: JOSE CRUZ CRAFT  Referring Physician: JOSE CRUZ CRAFT  Performed By: BONI Nelson     BSA: 1.9 m2  Height: 66 in  Weight: 179 lb  HR: 64  BP: 91/67 mmHg  _____________________________________________________________________________  __        Procedure  Complete Portable Echo Adult. Contrast Optison.  _____________________________________________________________________________  __        Interpretation Summary     Left ventricular systolic function is mild to moderately reduced. The visual  ejection fraction is estimated at 45%.  There is mild to moderate concentric left ventricular hypertrophy. There is  severe and predominantly inferolateral wall hypokinesis.  The right ventricle is not well  visualized but the right ventricle is mildly  dilated.  There is mild to moderate (1-2+) mitral regurgitation.  Normal IVC (1.5-2.5cm) with <50% respiratory collapse; right atrial pressure  is estimated at 10-15mmHg.  Contrast was used without apparent complications. Compared to the prior echo,  there is now a wall motion abnormality.  _____________________________________________________________________________  __        Left Ventricle  The left ventricle is normal in size. There is mild to moderate concentric  left ventricular hypertrophy. Left ventricular systolic function is mild to  moderately reduced. Diastolic Doppler findings (E/E' ratio and/or other  parameters) suggest left ventricular filling pressures are indeterminate. The  visual ejection fraction is estimated at 45%. There is severe inferolateral  wall hypokinesis.     Right Ventricle  The right ventricle is mildly dilated. There is mild right ventricular  hypertrophy. The right ventricle is not well visualized.     Atria  Normal left atrial size. Right atrial size is normal. There is no atrial shunt  seen.     Mitral Valve  There is mild to moderate (1-2+) mitral regurgitation.        Tricuspid Valve  Normal IVC (1.5-2.5cm) with <50% respiratory collapse; right atrial pressure  is estimated at 10-15mmHg. There is trace tricuspid regurgitation. Right  ventricular systolic pressure could not be approximated due to inadequate  tricuspid regurgitation.     Aortic Valve  No aortic regurgitation is present. No hemodynamically significant valvular  aortic stenosis.     Pulmonic Valve  There is no pulmonic valvular stenosis.     Vessels  Normal size aorta.     Pericardium  The pericardium appears normal.     _____________________________________________________________________________  __  MMode/2D Measurements & Calculations  IVSd: 1.2 cm  LVIDd: 4.8 cm  LVIDs: 3.4 cm  LVPWd: 1.3 cm  FS: 29.3 %  LV mass(C)d: 235.9 grams  LV mass(C)dI: 123.6 grams/m2     Ao  root diam: 3.5 cm  LA dimension: 4.5 cm  asc Aorta Diam: 3.4 cm  LA/Ao: 1.3  LVOT diam: 2.2 cm  LVOT area: 3.7 cm2     EF(MOD-bp): 44.7 %  LA Volume (BP): 45.7 ml  LA Volume Index (BP): 23.9 ml/m2  RWT: 0.55        Doppler Measurements & Calculations  MV E max santy: 50.0 cm/sec  MV A max santy: 74.4 cm/sec  MV E/A: 0.67     MV dec time: 0.25 sec  Pulm Sys Santy: 49.0 cm/sec  Pulm Schwarz Santy: 35.3 cm/sec  Pulm A Revs Santy: 30.8 cm/sec  Pulm S/D: 1.4  E/E' av.1  Lateral E/e': 7.2  Medial E/e': 8.9           _____________________________________________________________________________  __           Report approved by: Donnie Atwood 10/05/2018 03:57 PM      Basic metabolic panel   Result Value Ref Range    Sodium 141 133 - 144 mmol/L    Potassium 4.1 3.4 - 5.3 mmol/L    Chloride 108 94 - 109 mmol/L    Carbon Dioxide 28 20 - 32 mmol/L    Anion Gap 5 3 - 14 mmol/L    Glucose 91 70 - 99 mg/dL    Urea Nitrogen 18 7 - 30 mg/dL    Creatinine 1.12 0.66 - 1.25 mg/dL    GFR Estimate 65 >60 mL/min/1.7m2    GFR Estimate If Black 79 >60 mL/min/1.7m2    Calcium 8.3 (L) 8.5 - 10.1 mg/dL       Recent Labs  Lab 10/06/18  0539 10/05/18  1308 10/05/18  0520 10/04/18  2030   WBC  --   --  10.4 10.6   HGB  --   --  13.2* 14.5   MCV  --   --  92 92   PLT  --   --  222 211     --  139 138   POTASSIUM 4.1  --  4.4 4.3   CHLORIDE 108  --  106 104   CO2 28  --  26 26   BUN 18  --  14 14   CR 1.12  --  0.99 0.94   ANIONGAP 5  --  7 8   MANNIE 8.3*  --  8.4* 8.4*   GLC 91  --  94 96   TROPI  --  >200.000* >200.000*  --        No results found for this or any previous visit (from the past 24 hour(s)).

## 2018-10-06 NOTE — PLAN OF CARE
Problem: Patient Care Overview  Goal: Plan of Care/Patient Progress Review  Discharge Planner PT   Patient plan for discharge: Home with outpatient cardiac rehab  Current status: Pt dwight 20 mins on treadmill at speed 3.2, slight hypertensive response to exercise, /72 at rest 129/97 after activity. Pt's HR  throughout mobility. Pt denies symptoms and able to converse throughout.  Barriers to return to prior living situation: None  Recommendations for discharge: Home with outpatient cardiac rehab  Rationale for recommendations: Pt is mobilizing well, able to complete activities necessary for discharge to home, would benefit from continued monitoring and education to optimize recovery. Plan is to see pt this afternoon for another cardiac rehab session to ensure stability with activity as pt's medications have been adjusted.       Entered by: Katarina Rueda 10/06/2018 9:27 AM

## 2018-10-06 NOTE — DISCHARGE SUMMARY
[facility]    Discharge Summary  Hospitalist    Date of Admission:  10/4/2018  Date of Discharge:  10/6/2018  7:43 PM  Discharging Provider: Lilliana Chaves MD  Date of Service (when I saw the patient): 10/6/2018    Discharge Diagnoses   Inferior STEMI s/p BARBARA to RCA  Ischemic CM with LVEF of 45%  Clarke's Esophagus    History of Present Illness   Hardy Landry is a 68 year old male with PMH of CAD with MI in 2010 s/p stent to circumflex, angioplasty to LAD, and stent to RCA, GERD, asthma, HLD, who presents with an inferior STEMI, urgent cath lab activation s/p BARBARA to RCA. See H & P for detail.     Hospital Course   Hardy Landry was admitted on 10/4/2018.  The following problems were addressed during his hospitalization:    Inferior STEMI s/p BARBARA to RCA  Ischemic cardiomyopathy with LV EF of 45%  Follows Dr. Simon in Cardiology clinic. Hx of MI in 2010 s/p stent to circumflex. He had LAD disease which could not be stended, s/p POBA. He later also had stent placed to RCA. Repeat angiogram in 2012 with stable CAD. Patient presenting this admission with inferior STEMI, urgent cath lab activation noted occluded distal RCA distal to previous RCA stent. This was stented.  There as some post-procedural hypotension and patient was briefly on a dopamine drip however he was weaned off prior to transfer to CICU. Patient's groin stable post-cath. Post-cath EKG with minimal ST elevation in inferior leads.  TTE shows LVEF of 45%, RWMA  - Started metoprolol XL 12.5mg daily, lisinopril increased to 5mg daily  - Ticagrelor 90mg BID, ASA daily and Lipitor     - Tolerated cardiac rehab and blood pressure remained stable  -  Follow up with cardiology after discharge for further care              Clarke's Esophagus: PTA omeprazole BID and ranitidine BID      Jehovah's witnesses: Patient notes he does not want blood transfusions.      Lilliana Chaves MD    Significant Results and Procedures   See imaging  and labs below.     Pending Results     Unresulted Labs Ordered in the Past 30 Days of this Admission     No orders found from 8/5/2018 to 10/5/2018.          Code Status   Full Code       Primary Care Physician   CHRISTIN OLIVERA        Discharge Disposition   Discharged to home  Condition at discharge: Stable    Consultations This Hospital Stay   CARDIAC REHAB IP CONSULT  NUTRITION SERVICES ADULT IP CONSULT  PHARMACY IP CONSULT  PHARMACY IP CONSULT  CARDIOLOGY IP CONSULT  CARDIOLOGY IP CONSULT  CARDIAC REHAB IP CONSULT  SMOKING CESSATION PROGRAM IP CONSULT    Time Spent on this Encounter   I, Lilliana Chaves, personally saw the patient today and spent greater than 30 minutes discharging this patient.    Discharge Orders     CARDIAC REHAB REFERRAL     Follow-up and recommended labs and tests    Follow up with cardiology as scheduled.     Full Code     Echocardiogram   Administration of IV contrast will be tailored to this examination per the appropriate written protocol listed in the Echocardiography department Protocol Book, or by the supervising Cardiologist. This may result in an order change.    Use of contrast is at the discretion of the supervising Cardiologist.     Diet   Follow this diet upon discharge: low fat, low cholesterol diet       Discharge Medications   Discharge Medication List as of 10/6/2018  4:16 PM      START taking these medications    Details   atorvastatin (LIPITOR) 40 MG tablet Take 1 tablet (40 mg) by mouth every evening, Disp-30 tablet, R-0, E-PrescribeFuture refills by PCP Dr. CHRISTIN OLIVERA with phone number [phone].      lisinopril (PRINIVIL/ZESTRIL) 5 MG tablet Take 1 tablet (5 mg) by mouth daily, Disp-30 tablet, R-0, E-PrescribeFuture refills by PCP Dr. CHRISTIN OLIVERA with phone number [phone].      metoprolol succinate (TOPROL-XL) 25 MG 24 hr tablet Take 0.5 tablets (12.5 mg) by mouth daily, Disp-30 tablet, R-0, E-PrescribeFuture refills by PCP Dr. CHRISTIN OLIVERA with  phone number [phone].      nitroGLYcerin (NITROSTAT) 0.4 MG sublingual tablet For chest pain place 1 tablet under the tongue every 5 minutes for 3 doses. If symptoms persist 5 minutes after 1st dose call 911., Disp-25 tablet, R-0, E-Prescribe      spironolactone (ALDACTONE) 25 MG tablet Take 0.5 tablets (12.5 mg) by mouth daily, Disp-30 tablet, R-0, E-PrescribeFuture refills by PCP Dr. CHRISTIN OLIVERA with phone number [phone].      ticagrelor (BRILINTA) 90 MG tablet Take 1 tablet (90 mg) by mouth every 12 hours, Disp-60 tablet, R-0, E-PrescribeFuture refills by PCP Dr. CHRISTIN OILVERA with phone number [phone].         CONTINUE these medications which have NOT CHANGED    Details   aspirin 81 MG tablet Take 81 mg by mouth daily., Historical      OMEPRAZOLE PO Take 20 mg by mouth 2 times daily (before meals) , Historical      ranitidine (ZANTAC) 150 MG tablet Take 150 mg by mouth 2 times daily, Historical         STOP taking these medications       simvastatin (ZOCOR) 40 MG tablet Comments:   Reason for Stopping:             Allergies   No Known Allergies  Data   Most Recent 3 CBC's:  Recent Labs   Lab Test  10/05/18   0520  10/04/18   2030  11/28/12   0840   WBC  10.4  10.6  Charge credited  Test canceled by PCU/Clinic PRIMO CORRECTED ON 11/28 AT 0911: PREVIOUSLY REPORTED AS 6.7   HGB  13.2*  14.5  Charge credited  Test canceled by PCU/Clinic PRIMO CORRECTED ON 11/28 AT 0911: PREVIOUSLY REPORTED AS 14.0   MCV  92  92  Charge credited  Test canceled by PCU/Clinic PRIMO CORRECTED ON 11/28 AT 0911: PREVIOUSLY REPORTED AS 91   PLT  222  211  Charge credited  Test canceled by PCU/Clinic PRIMO CORRECTED ON 11/28 AT 0911: PREVIOUSLY REPORTED       Most Recent 3 BMP's:  Recent Labs   Lab Test  10/06/18   0539  10/05/18   0520  10/04/18   2030   NA  141  139  138   POTASSIUM  4.1  4.4  4.3   CHLORIDE  108  106  104   CO2  28  26  26   BUN  18  14  14   CR  1.12  0.99  0.94   ANIONGAP  5  7  8   MANNIE  8.3*   8.4*  8.4*   GLC  91  94  96     Most Recent 2 LFT's:  Recent Labs   Lab Test  11/12/10   0655   ALT  19     Most Recent INR's and Anticoagulation Dosing History:  Anticoagulation Dose History     Recent Dosing and Labs Latest Ref Rng & Units 11/28/2012    INR 0.86 - 1.14 Charge credited  Test canceled by PCU/Clinic  PRIMO  CORRECTED ON 11/28 AT 0911: PREVIOUSLY REPORTED AS 0.96         Most Recent 3 Troponin's:  Recent Labs   Lab Test  10/05/18   1308  10/05/18   0520  10/09/10   0935   TROPI  >200.000*  >200.000*  >80.000*     Most Recent Cholesterol Panel:  Recent Labs   Lab Test  10/04/18   2030   CHOL  129   LDL  58   HDL  64   TRIG  34     Most Recent 6 Bacteria Isolates From Any Culture (See EPIC Reports for Culture Details):No lab results found.  Most Recent TSH, T4 and A1c Labs:No lab results found.  Results for orders placed or performed in visit on 11/28/12   Heart Cath Left heart cath    Narrative       RAE LANDRY     PROCEDURE REPORT     PROCEDURES PERFORMED:  1.  Coronary angiogram.  2.  Left heart catheterization.  3.  Left ventriculogram.     APPROACH:  Right femoral artery.     COMPLICATIONS:  None noted.     INDICATION FOR THIS PROCEDURE:  Mr. Landry is a 62-year-old man with   a known history of coronary disease, status post inferolateral   infarction in 10/2010 treated with implantation of a 2.75 18 mm   length Everolimus-eluting stent in the proximal circumflex coronary.    At the time of his initial intervention, significant narrowings were   present in the mid LAD and the distal right coronary.  Because of   Protestant beliefs, the patient refused bypass surgery and instead   requested staged intervention.  We repeated angiography demonstrating   continued patency at the intervention site and circumflex.  An   attempt was made to stent the LAD in the mid portion, but the vessel   is very tortuous and calcified and we could not pass a stent in the   mid LAD.  We performed balloon dilatation  alone.  The patient had a   satisfactory result.  He was brought back in 1/2011 and diagnostic   angiography demonstrated continued patency in the LAD and the   circumflex intervention sites.  The distal right coronary was then   treated with a 3.5 x 15 mm length Everolimus-eluting stent with good   result.     Subsequent to the patient's intervention, he had remained free of   symptoms.  In 10/2012, the patient complained of mild exertional   dyspnea.  A stress echo demonstrated good exercise tolerance, but   there was felt to be hypokinesis in the lateral wall.  After   prolonged discussion with the patient, he requested angiography to   make certain his intervention sites remained patent since there was   no evidence of his need for further revascularization.     We have explained the risks of death, myocardial infarction, stroke,   hematoma, bleeding, infection, embolus, need for urgent bypass   surgery with failed PCI, the possibility of restenosis at   intervention sites.  He voiced understanding and wished to proceed.     PROCEDURES PERFORMED:  1.  Coronary angiogram.  The right femoral region was prepped and   draped in standard fashion and infiltrated with 1% lidocaine.  The   right femoral artery was entered with a single anterior wall   percutaneous stick and with Seldinger technique, a 4 Chilean sheath   was placed in the right femoral artery.  We advanced a 4.5 4 [place]an   Bassam left catheter and seated this catheter in the ostium of the   left main.  Diagnostic views were taken in orthogonal projections.    We exchanged for a 3DRC catheter and seated in the right coronary.    Right coronary angiography was performed in orthogonal views.  2.  Left heart catheterization.  We exchanged for a 4 Chilean pigtail   catheter.  We advanced this catheter under fluoroscopic guidance and   placed this catheter and measured pressures.  We crossed the aortic   valve.  We noted no gradient.  We measured pre left  ventriculogram   pressures.  3.  Left ventriculogram.  We next performed a left ventriculogram,   injecting contrast in the 30-degree IYER view.  We measured pressures   in the ventricle.  We pulled the catheter across the aortic valve.    We noted no gradient.     I carefully inspected the patient's films.  Although there was   moderate diffuse atherosclerosis present in all vessels, I did not   note a new focal narrowing that warranted mechanical intervention.    We stopped the procedure at this point.  The catheter was removed   from the sheath, the sheath removed from the leg.  Pressure was   applied.  Hemostasis achieved.  The patient was sent back to the kidd   for observation.  No complications were observed.     Total contrast 61 mL.  Total fluoroscopic time 3:57 minutes.     FINDINGS:  LVEDP was 28.     Left ventriculogram:  The ejection fraction is at the lower limits of   normal at 50-55%.  LV chamber size is upper limits of normal.  There   is posterior wall mild hypokinesis.  There is no mitral insufficiency.     Coronary angiogram:  Left main:  This vessel is short, but has no significant narrowing.     Circumflex LAD:  This vessel is very tortuous.  The proximal segment   at the site of previous stent implantation remains widely patent with   no restenosis.  The circumflex then gives off second and third   marginal branches.  Both of these marginal branches are tortuous,   demonstrate diffuse atheromatous change and have narrowings in the   range of 30% in their proximal segment, but there is no significant   narrowing and no change since the patient's last angiogram.     LAD:  This is a heavily calcified vessel in its proximal and mid   segments.  There is evidence of previous stent implantation in the   LAD in the mid vessel.  There is no significant restenosis in the   stented segment.  In the mid LAD, at the site of the previous   angioplasty procedure, there is a moderate narrowing in the range of    40-50%, but is unchanged from the patient's last angiogram.  The   distal LAD has no significant narrowing.  It is very tortuous. There   has been no significant change since the last angiogram.     Right coronary:  The right coronary artery is a dominant vessel.  The   proximal segment is free of significant narrowing.  The midsegment   demonstrates atheromatous change with narrowing in the range of 40%.    The stented segment of the distal right coronary artery is widely   patent.  The terminal right coronary artery is tortuous, but has no   significant narrowing.There has been no significant change since the   last angiogram.     DISCUSSION:  All of Mr. Landry's percutaneous intervention sites   remain widely patent.  The ejection fraction is lower limits of   normal.  The cause his dyspnea is not entirely clear, but may be due   to deconditioning.  He demonstrated good exercise tolerance from the   treadmill and, at this point, I would not advise any further   mechanical intervention for his CAD.  The patient is fairly   bradycardic at rest and I would make certain he does not have   hypothyroidism.     RECOMMENDATIONS:  1.  Continue aspirin and statin therapy.  2.  Consider thyroid function studies with primary care MD.  3.  Exercise and diet.    4.  Long-term followup in our office to make certain the patient has   not developed angina or objective evidence of ischemia.     Jeet Simon M.D.      cc:  Lamonte Leslie M.D.

## 2018-10-06 NOTE — PLAN OF CARE
Problem: Patient Care Overview  Goal: Plan of Care/Patient Progress Review  Outcome: Improving  Pt alert, oriented and able to initiate needs appropriately.  Pt denies any pain or other discomforts. Vitals remain stable and pt in SR.  Independent in room.  Plan for cardiac rehab today.

## 2018-10-06 NOTE — PROGRESS NOTES
Reviewed home medications, schedule and follow up appts.  Angioseal care instructions, card given.  Pt discharged via w/c accompanied by NA, family.  See AVS

## 2018-10-08 ENCOUNTER — CARE COORDINATION (OUTPATIENT)
Dept: CARDIOLOGY | Facility: CLINIC | Age: 68
End: 2018-10-08

## 2018-10-08 NOTE — PROGRESS NOTES
Patient was evaluated by cardiology while inpatient for STEMI. Called patient to discuss any post hospital d/c questions, review PTA medications, and confirm f/u appts. RN confirmed with patient he was d/c with the antiplatelet Brilinta. Patient denied any questions regarding new medications or changes with their PTA medications. Patient denied any SOB, chest pain, or light headedness. Rt groin cardiac cath site is without bleeding, swelling, redness or tenderness. Denies fever. RN confirmed patient has an apt scheduled on 10/12/18 with KENRICK Cari Thompson and on 10/16/18 for Cardiac Rehab. Patient advised to call clinic with any cardiac related questions or concerns prior to this kenrick't. Patient verbalized understanding and agreed with plan.

## 2018-10-11 ENCOUNTER — HOSPITAL ENCOUNTER (OUTPATIENT)
Dept: CARDIAC REHAB | Facility: CLINIC | Age: 68
End: 2018-10-11
Attending: INTERNAL MEDICINE
Payer: COMMERCIAL

## 2018-10-11 DIAGNOSIS — I21.11 ST ELEVATION MYOCARDIAL INFARCTION INVOLVING RIGHT CORONARY ARTERY (H): ICD-10-CM

## 2018-10-11 PROCEDURE — 93798 PHYS/QHP OP CAR RHAB W/ECG: CPT

## 2018-10-11 PROCEDURE — 93797 PHYS/QHP OP CAR RHAB WO ECG: CPT | Mod: XU

## 2018-10-11 PROCEDURE — 40000575 ZZH STATISTIC OP CARDIAC VISIT #2

## 2018-10-11 PROCEDURE — 40000116 ZZH STATISTIC OP CR VISIT

## 2018-10-12 ENCOUNTER — OFFICE VISIT (OUTPATIENT)
Dept: CARDIOLOGY | Facility: CLINIC | Age: 68
End: 2018-10-12
Payer: COMMERCIAL

## 2018-10-12 ENCOUNTER — HOSPITAL ENCOUNTER (OUTPATIENT)
Dept: CARDIAC REHAB | Facility: CLINIC | Age: 68
End: 2018-10-12
Attending: INTERNAL MEDICINE
Payer: COMMERCIAL

## 2018-10-12 VITALS
BODY MASS INDEX: 28.45 KG/M2 | DIASTOLIC BLOOD PRESSURE: 69 MMHG | OXYGEN SATURATION: 99 % | HEART RATE: 62 BPM | SYSTOLIC BLOOD PRESSURE: 125 MMHG | HEIGHT: 66 IN | WEIGHT: 177 LBS

## 2018-10-12 DIAGNOSIS — I21.11 ST ELEVATION MYOCARDIAL INFARCTION INVOLVING RIGHT CORONARY ARTERY (H): ICD-10-CM

## 2018-10-12 LAB
ANION GAP SERPL CALCULATED.3IONS-SCNC: 5 MMOL/L (ref 3–14)
BUN SERPL-MCNC: 18 MG/DL (ref 7–30)
CALCIUM SERPL-MCNC: 9 MG/DL (ref 8.5–10.1)
CHLORIDE SERPL-SCNC: 102 MMOL/L (ref 94–109)
CO2 SERPL-SCNC: 29 MMOL/L (ref 20–32)
CREAT SERPL-MCNC: 1.19 MG/DL (ref 0.66–1.25)
GFR SERPL CREATININE-BSD FRML MDRD: 61 ML/MIN/1.7M2
GLUCOSE SERPL-MCNC: 69 MG/DL (ref 70–99)
POTASSIUM SERPL-SCNC: 4.5 MMOL/L (ref 3.4–5.3)
SODIUM SERPL-SCNC: 136 MMOL/L (ref 133–144)

## 2018-10-12 PROCEDURE — 40000116 ZZH STATISTIC OP CR VISIT: Performed by: OCCUPATIONAL THERAPIST

## 2018-10-12 PROCEDURE — 99214 OFFICE O/P EST MOD 30 MIN: CPT | Performed by: PHYSICIAN ASSISTANT

## 2018-10-12 PROCEDURE — 93798 PHYS/QHP OP CAR RHAB W/ECG: CPT | Performed by: OCCUPATIONAL THERAPIST

## 2018-10-12 PROCEDURE — 36415 COLL VENOUS BLD VENIPUNCTURE: CPT | Performed by: PHYSICIAN ASSISTANT

## 2018-10-12 PROCEDURE — 80048 BASIC METABOLIC PNL TOTAL CA: CPT | Performed by: PHYSICIAN ASSISTANT

## 2018-10-12 RX ORDER — SPIRONOLACTONE 25 MG/1
25 TABLET ORAL DAILY
Qty: 30 TABLET | Refills: 11 | Status: SHIPPED | OUTPATIENT
Start: 2018-10-12 | End: 2018-11-05

## 2018-10-12 RX ORDER — METOPROLOL SUCCINATE 25 MG/1
25 TABLET, EXTENDED RELEASE ORAL DAILY
Qty: 30 TABLET | Refills: 11 | Status: SHIPPED | OUTPATIENT
Start: 2018-10-12 | End: 2018-11-05

## 2018-10-12 NOTE — PROGRESS NOTES
CARDIOLOGY CLINIC PROGRESS NOTE    DOS: 10/12/2018      Hardy Landry  : 1950, 68 year old  MRN: [id number]      History:  I had the pleasure of meeting Hardy Landry today in the cardiology clinic. He is a patient of Dr. Simon.     He is a 68 year old man with history of CAD, dyslipidemia, GERD, asthma.  In addition, he is JEHOVAH WITNESS so cannot receive blood transfusions.    Re his CAD, in  he had an inferolateral infarction treated with stent implantation in circumflex.  At the time of his MI, there were significant narrowings present in the mid LAD and distal right coronary.  The patient was unwilling to consider bypass surgery.  In 2010, we attempted to stent the mid LAD, but were unable to stent the vessel due to tortuosity and calcification. The patient underwent plain balloon angioplasty of the mid-LAD with good angiographic results.  In , he returned for repeat angiography which demonstrated the  intervention sites in the circumflex and the mid LAD remained patent.  A BARBARA was placed in the distal right coronary.  Angiogram in  showed a normal ejection fraction of 50-55% with continued patency at all  intervention sites with 0% stenosis at the CX and RCA intervention sites and a 40% mid vessel LAD stenosis with CHRISTI 3 flow.    He was last seen by Dr. Simon 18.  He was asymptomatic, no medication changes made.     10/4/18 he presented to [facility] ED after feeling generally unwell all day and then developed some chest discomfort.  EKG showed inferior ST elevation, transferred to [facility] for STEMI.  Cath 10/4/18 showed thrombotically occluded mid RCA just proximal to the previous RCA stent, s/p BARBARA overlapping previous stent.  LV gram LVEF 40-45%, LVEDP 20 mmHg.  Noted post-procedural hypotension requiring dopamine briefly in the cath lab.  Had some NSVT evening after cath.  None noted after that.  TTE 10/5/18 showed LVEF 45% w/ RWMA.    He was started on Brilinta x 1 year  (then Plavix indefinitely recommended), switched statin to atorvastatin, and was started on Toprol XL 12.5 mg, spironolactone 12.5 mg, lisinopril 5 mg.     Interval History:   He presents today for follow up.   Generally feeling well.   Some mild SOB with steps (when he runs up them, not walks). Not new, not worse. No other SOB, no edema, no orthopnea.   No palpitations.   No chest pain.   No bleeding.   He has been taking Toprol XL 25 mg and spironolactone 25 mg (instead of 1/2 tabs).  No LH, no dizzy, no syncope.   Groin site looks good. No bruising, no bumps, no pain.       ROS:  Skin:  Negative for     Eyes:  Negative for    ENT:  Positive for hearing loss  Respiratory:  Positive for dyspnea on exertion  Cardiovascular:  Negative for    Gastroenterology: Positive for diarrhea  Genitourinary:  not assessed    Musculoskeletal:  Negative for    Neurologic:  Negative for    Psychiatric:  Negative    Heme/Lymph/Imm:  Negative    Endocrine:  Negative      PAST MEDICAL HISTORY:  Past Medical History:   Diagnosis Date     Chest pain      Coronary artery disease     stents 2010, 2011     GERD without esophagitis      Hyperlipidemia      Impaired fasting glucose      Myocardial infarct, old      Old myocardial infarction 2010       PAST SURGICAL HISTORY:  Past Surgical History:   Procedure Laterality Date     ANGIOGRAM  11/2012    All  percutaneous intervention sites   remain widely patent     HEART CATH, ANGIOPLASTY  10/2010    stent to circ      HEART CATH, ANGIOPLASTY  11/2010    PTCA  mid LAD      HEART CATH, ANGIOPLASTY  1/2011    PTCA & stent RCA        SOCIAL HISTORY:  Social History     Social History     Marital status:      Spouse name: N/A     Number of children: N/A     Years of education: N/A     Social History Main Topics     Smoking status: Former Smoker     Smokeless tobacco: Never Used      Comment: only smoke about 5 years and quit in 1973     Alcohol use 0.0 oz/week     0 Standard drinks or  "equivalent per week      Comment: beer occasionally 1-2 a month     Drug use: Not on file     Sexual activity: Not on file     Other Topics Concern     Caffeine Concern No     decaf coffee      Special Diet No     Exercise Yes     basketball. tennis,     Social History Narrative       FAMILY HISTORY:  Family History   Problem Relation Age of Onset     Colon Cancer Father      Coronary Artery Disease Mother      Hypertension Brother      Colon Cancer Brother      Coronary Artery Disease Brother      Hernia Sister      No Known Problems Sister      No Known Problems Brother      accident        MEDS:   Current Outpatient Prescriptions on File Prior to Visit:  aspirin 81 MG tablet Take 81 mg by mouth daily.   atorvastatin (LIPITOR) 40 MG tablet Take 1 tablet (40 mg) by mouth every evening   lisinopril (PRINIVIL/ZESTRIL) 5 MG tablet Take 1 tablet (5 mg) by mouth daily   nitroGLYcerin (NITROSTAT) 0.4 MG sublingual tablet For chest pain place 1 tablet under the tongue every 5 minutes for 3 doses. If symptoms persist 5 minutes after 1st dose call 911.   OMEPRAZOLE PO Take 20 mg by mouth 2 times daily (before meals)    ranitidine (ZANTAC) 150 MG tablet Take 150 mg by mouth 2 times daily   ticagrelor (BRILINTA) 90 MG tablet Take 1 tablet (90 mg) by mouth every 12 hours   [DISCONTINUED] metoprolol succinate (TOPROL-XL) 25 MG 24 hr tablet Take 0.5 tablets (12.5 mg) by mouth daily (Patient taking differently: Take 12.5 mg by mouth daily Pt taking whole tab each day)   [DISCONTINUED] spironolactone (ALDACTONE) 25 MG tablet Take 0.5 tablets (12.5 mg) by mouth daily (Patient taking differently: Take 12.5 mg by mouth daily Pt taking whole pill daily)     No current facility-administered medications on file prior to visit.     ALLERGIES: No Known Allergies    PHYSICAL EXAM:  Vitals: /69 (BP Location: Right arm)  Pulse 62  Ht 1.676 m (5' 6\")  Wt 80.3 kg (177 lb)  SpO2 99%  BMI 28.57 kg/m2  Constitutional:  cooperative, " alert and oriented, well developed, well nourished, in no acute distress        Skin:  warm and dry to the touch, no apparent skin lesions or masses noted        Head:  normocephalic, no masses or lesions        Eyes:  pupils equal and round;conjunctivae and lids unremarkable;sclera white;no xanthalasma        ENT:  no pallor or cyanosis, dentition good        Neck:  JVP normal        Respiratory:  normal breath sounds, clear to auscultation, normal A-P diameter, normal symmetry, normal respiratory excursion, no use of accessory muscles        Cardiac: regular rhythm, normal S1/S2, no S3 or S4, apical impulse not displaced, no murmurs, gallops or rubs                  GI:  abdomen soft;BS normoactive        Vascular:                                   RFA site: no ecchymosis, no bruit    Extremities and Musculoskeletal:  no deformities, clubbing, cyanosis, erythema observed;no edema        Neurological:  no gross motor deficits;affect appropriate          LABS/DATA:  I reviewed the following:  Cardiac cath 10/4/18:  Reviewed    Echo 10/5/18:  Interpretation Summary     Left ventricular systolic function is mild to moderately reduced. The visual  ejection fraction is estimated at 45%.  There is mild to moderate concentric left ventricular hypertrophy. There is  severe and predominantly inferolateral wall hypokinesis.  The right ventricle is not well visualized but the right ventricle is mildly  dilated.  There is mild to moderate (1-2+) mitral regurgitation.  Normal IVC (1.5-2.5cm) with <50% respiratory collapse; right atrial pressure  is estimated at 10-15mmHg.  Contrast was used without apparent complications. Compared to the prior echo,  there is now a wall motion abnormality.      ASSESSMENT/PLAN:  CAD  - Prior CAD history as noted above  - Now with recurrent inferior STEMI 10/4/18.  Cor angio showed  thrombotically occluded mid RCA just proximal to the previous RCA stent, s/p BARBARA overlapping previous stent.   LVEDP 20 mmHg.  Noted post-procedural hypotension requiring dopamine briefly in the cath lab.  Had some NSVT evening after cath.  None noted after that.  TTE 10/5/18 showed LVEF 45% w/ RWMA.    - He is doing well today. No concerning sxs of angina, CHF, arrhythmias.   - He is on ASA, Brilinta x 1 year (then Plavix indefinitely recommended), atorvastatin 40 mg.  He was actually started on Toprol XL 12.5 mg, spironolactone 12.5 mg, but is taking 25 mg each and is tolerating.  Will continue at 25 mg.  Also on lisinopril 5 mg.   - BMP today  - Mediterranean style diet, and low sodium  - Cardiac rehab  - See Dr. Simon in 3 months with repeat echo, labs    Mild ischemic cardiomyopathy  - TTE 10/5/18 showed LVEF 45% w/ RWMA  - No CHF on exam  - Continue BB, ACEi, spironolactone  - Low sodium diet  - Repeat echo in 3 months    Dyslipidemia  - Simvastatin changed to atorvastatin 40 mg  - FLP/ALT will be done when he sees Dr. Simon      Follow up:  BMP today after dye and new lisinopril and spironolactone  3 months see Dr. Simon with repeat echo, FLP/ALT      ADDENDUM  Component      Latest Ref Rng & Units 10/12/2018   Sodium      133 - 144 mmol/L 136   Potassium      3.4 - 5.3 mmol/L 4.5   Chloride      94 - 109 mmol/L 102   Carbon Dioxide      20 - 32 mmol/L 29   Anion Gap      3 - 14 mmol/L 5   Glucose      70 - 99 mg/dL 69 (L)   Urea Nitrogen      7 - 30 mg/dL 18   Creatinine      0.66 - 1.25 mg/dL 1.19   GFR Estimate      >60 mL/min/1.7m2 61   GFR Estimate If Black      >60 mL/min/1.7m2 74   Calcium      8.5 - 10.1 mg/dL 9.0     BMP looks ok and stable after contrast dye, lisinopril and spironolactone added  Cari Thompson PA-C

## 2018-10-12 NOTE — LETTER
10/12/2018    CHRISTIN OLIVERA  [facility] [address] [place] [address]    RE: Hardy BARRY Gris       Dear Colleague,    I had the pleasure of seeing Hardy Landry in the [facility].    CARDIOLOGY CLINIC PROGRESS NOTE    DOS: 10/12/2018      Hardy Landry  : 1950, 68 year old  MRN: [id number]      History:  I had the pleasure of meeting Hardy Landry today in the cardiology clinic. He is a patient of Dr. Simon.     He is a 68 year old man with history of CAD, dyslipidemia, GERD, asthma.  In addition, he is JEHOVAH WITNESS so cannot receive blood transfusions.    Re his CAD, in  he had an inferolateral infarction treated with stent implantation in circumflex.  At the time of his MI, there were significant narrowings present in the mid LAD and distal right coronary.  The patient was unwilling to consider bypass surgery.  In 2010, we attempted to stent the mid LAD, but were unable to stent the vessel due to tortuosity and calcification. The patient underwent plain balloon angioplasty of the mid-LAD with good angiographic results.  In , he returned for repeat angiography which demonstrated the  intervention sites in the circumflex and the mid LAD remained patent.  A  BARBARA was placed in the distal right coronary.  Angiogram in  showed a normal ejection fraction of 50-55% with continued patency at all  intervention sites with 0% stenosis at the CX and RCA intervention sites and a 40% mid vessel LAD stenosis with CHRISTI 3 flow.    He was last seen by Dr. iSmon 18.  He was asymptomatic, no medication changes made.     10/4/18 he presented to [facility] ED after feeling generally unwell all day and then developed some chest discomfort.  EKG showed inferior ST elevation, transferred to [facility] for STEMI.  Cath 10/4/18 showed thrombotically occluded mid RCA just proximal to the previous RCA stent, s/p BARBARA overlapping previous stent.  LV gram LVEF 40-45%,  LVEDP 20 mmHg.  Noted post-procedural hypotension requiring dopamine briefly in the cath lab.  Had some NSVT evening after cath.  None noted after that.  TTE 10/5/18 showed LVEF 45% w/ RWMA.    He was started on Brilinta x 1 year (then Plavix indefinitely recommended), switched statin to atorvastatin, and was started on Toprol XL 12.5 mg, spironolactone 12.5 mg, lisinopril 5 mg.     Interval History:   He presents today for follow up.   Generally feeling well.   Some mild SOB with steps (when he runs up them, not walks). Not new, not worse. No other SOB, no edema, no orthopnea.   No palpitations.   No chest pain.   No bleeding.   He has been taking Toprol XL 25 mg and spironolactone 25 mg (instead of 1/2 tabs).  No LH, no dizzy, no syncope.   Groin site looks good. No bruising, no bumps, no pain.       ROS:  Skin:  Negative for     Eyes:  Negative for    ENT:  Positive for hearing loss  Respiratory:  Positive for dyspnea on exertion  Cardiovascular:  Negative for    Gastroenterology: Positive for diarrhea  Genitourinary:  not assessed    Musculoskeletal:  Negative for    Neurologic:  Negative for    Psychiatric:  Negative    Heme/Lymph/Imm:  Negative    Endocrine:  Negative      PAST MEDICAL HISTORY:  Past Medical History:   Diagnosis Date     Chest pain      Coronary artery disease     stents 2010, 2011     GERD without esophagitis      Hyperlipidemia      Impaired fasting glucose      Myocardial infarct, old      Old myocardial infarction 2010       PAST SURGICAL HISTORY:  Past Surgical History:   Procedure Laterality Date     ANGIOGRAM  11/2012    All  percutaneous intervention sites   remain widely patent     HEART CATH, ANGIOPLASTY  10/2010    stent to circ      HEART CATH, ANGIOPLASTY  11/2010    PTCA  mid LAD      HEART CATH, ANGIOPLASTY  1/2011    PTCA & stent RCA        SOCIAL HISTORY:  Social History     Social History     Marital status:      Spouse name: N/A     Number of children: N/A     Years  of education: N/A     Social History Main Topics     Smoking status: Former Smoker     Smokeless tobacco: Never Used      Comment: only smoke about 5 years and quit in 1973     Alcohol use 0.0 oz/week     0 Standard drinks or equivalent per week      Comment: beer occasionally 1-2 a month     Drug use: Not on file     Sexual activity: Not on file     Other Topics Concern     Caffeine Concern No     decaf coffee      Special Diet No     Exercise Yes     basketball. tennis,     Social History Narrative       FAMILY HISTORY:  Family History   Problem Relation Age of Onset     Colon Cancer Father      Coronary Artery Disease Mother      Hypertension Brother      Colon Cancer Brother      Coronary Artery Disease Brother      Hernia Sister      No Known Problems Sister      No Known Problems Brother      accident        MEDS:   Current Outpatient Prescriptions on File Prior to Visit:  aspirin 81 MG tablet Take 81 mg by mouth daily.   atorvastatin (LIPITOR) 40 MG tablet Take 1 tablet (40 mg) by mouth every evening   lisinopril (PRINIVIL/ZESTRIL) 5 MG tablet Take 1 tablet (5 mg) by mouth daily   nitroGLYcerin (NITROSTAT) 0.4 MG sublingual tablet For chest pain place 1 tablet under the tongue every 5 minutes for 3 doses. If symptoms persist 5 minutes after 1st dose call 911.   OMEPRAZOLE PO Take 20 mg by mouth 2 times daily (before meals)    ranitidine (ZANTAC) 150 MG tablet Take 150 mg by mouth 2 times daily   ticagrelor (BRILINTA) 90 MG tablet Take 1 tablet (90 mg) by mouth every 12 hours   [DISCONTINUED] metoprolol succinate (TOPROL-XL) 25 MG 24 hr tablet Take 0.5 tablets (12.5 mg) by mouth daily (Patient taking differently: Take 12.5 mg by mouth daily Pt taking whole tab each day)   [DISCONTINUED] spironolactone (ALDACTONE) 25 MG tablet Take 0.5 tablets (12.5 mg) by mouth daily (Patient taking differently: Take 12.5 mg by mouth daily Pt taking whole pill daily)     No current facility-administered medications on file  "prior to visit.     ALLERGIES: No Known Allergies    PHYSICAL EXAM:  Vitals: /69 (BP Location: Right arm)  Pulse 62  Ht 1.676 m (5' 6\")  Wt 80.3 kg (177 lb)  SpO2 99%  BMI 28.57 kg/m2  Constitutional:  cooperative, alert and oriented, well developed, well nourished, in no acute distress        Skin:  warm and dry to the touch, no apparent skin lesions or masses noted        Head:  normocephalic, no masses or lesions        Eyes:  pupils equal and round;conjunctivae and lids unremarkable;sclera white;no xanthalasma        ENT:  no pallor or cyanosis, dentition good        Neck:  JVP normal        Respiratory:  normal breath sounds, clear to auscultation, normal A-P diameter, normal symmetry, normal respiratory excursion, no use of accessory muscles        Cardiac: regular rhythm, normal S1/S2, no S3 or S4, apical impulse not displaced, no murmurs, gallops or rubs                  GI:  abdomen soft;BS normoactive        Vascular:                                   RFA site: no ecchymosis, no bruit    Extremities and Musculoskeletal:  no deformities, clubbing, cyanosis, erythema observed;no edema        Neurological:  no gross motor deficits;affect appropriate          LABS/DATA:  I reviewed the following:  Cardiac cath 10/4/18:  Reviewed    Echo 10/5/18:  Interpretation Summary     Left ventricular systolic function is mild to moderately reduced. The visual  ejection fraction is estimated at 45%.  There is mild to moderate concentric left ventricular hypertrophy. There is  severe and predominantly inferolateral wall hypokinesis.  The right ventricle is not well visualized but the right ventricle is mildly  dilated.  There is mild to moderate (1-2+) mitral regurgitation.  Normal IVC (1.5-2.5cm) with <50% respiratory collapse; right atrial pressure  is estimated at 10-15mmHg.  Contrast was used without apparent complications. Compared to the prior echo,  there is now a wall motion " abnormality.      ASSESSMENT/PLAN:  CAD  - Prior CAD history as noted above  - Now with recurrent inferior STEMI 10/4/18.  Cor angio showed  thrombotically occluded mid RCA just proximal to the previous RCA stent, s/p BARBARA overlapping previous stent.  LVEDP 20 mmHg.  Noted post-procedural hypotension requiring dopamine briefly in the cath lab.  Had some NSVT evening after cath.  None noted after that.  TTE 10/5/18 showed LVEF 45% w/ RWMA.    - He is doing well today. No concerning sxs of angina, CHF, arrhythmias.   - He is on ASA, Brilinta x 1 year (then Plavix indefinitely recommended), atorvastatin 40 mg.  He was actually started on Toprol XL 12.5 mg, spironolactone 12.5 mg, but is taking 25 mg each and is tolerating.  Will continue at 25 mg.  Also on lisinopril 5 mg.   - BMP today  - Mediterranean style diet, and low sodium  - Cardiac rehab  - See Dr. Simon in 3 months with repeat echo, labs    Mild ischemic cardiomyopathy  - TTE 10/5/18 showed LVEF 45% w/ RWMA  - No CHF on exam  - Continue BB, ACEi, spironolactone  - Low sodium diet  - Repeat echo in 3 months    Dyslipidemia  - Simvastatin changed to atorvastatin 40 mg  - FLP/ALT will be done when he sees Dr. Simon      Follow up:  BMP today after dye and new lisinopril and spironolactone  3 months see Dr. Simon with repeat echo, FLP/ALT      ADDENDUM  Component      Latest Ref Rng & Units 10/12/2018   Sodium      133 - 144 mmol/L 136   Potassium      3.4 - 5.3 mmol/L 4.5   Chloride      94 - 109 mmol/L 102   Carbon Dioxide      20 - 32 mmol/L 29   Anion Gap      3 - 14 mmol/L 5   Glucose      70 - 99 mg/dL 69 (L)   Urea Nitrogen      7 - 30 mg/dL 18   Creatinine      0.66 - 1.25 mg/dL 1.19   GFR Estimate      >60 mL/min/1.7m2 61   GFR Estimate If Black      >60 mL/min/1.7m2 74   Calcium      8.5 - 10.1 mg/dL 9.0     BMP looks ok and stable after contrast dye, lisinopril and spironolactone added  Cari Thompson PA-C    Thank you for allowing me to  participate in the care of your patient.      Sincerely,     Cari Thompson PA-C     [facility]

## 2018-10-12 NOTE — MR AVS SNAPSHOT
After Visit Summary   10/12/2018    Hardy Landry    MRN: [id number]           Patient Information     Date Of Birth          1950        Visit Information        Provider Department      10/12/2018 8:00 AM Cari Thompson PA-C [facility]        Today's Diagnoses     ST elevation myocardial infarction involving right coronary artery (H)           Follow-ups after your visit        Additional Services     Follow-Up with Cardiologist                 Your next 10 appointments already scheduled     Oct 12, 2018 10:00 AM CDT   Cardiac Treatment with Rh Cardiac Rehab 1   [facility] ([facility])    [address], [address]  [facility] [address]   [phone]            Oct 15, 2018  8:00 AM CDT   Cardiac Treatment with Rh Cardiac Rehab 1   [facility] ([facility])    [address] [place] [address], [address]  [facility] [address]   [phone]            Oct 17, 2018  8:00 AM CDT   Cardiac Treatment with Rh Cardiac Rehab 1   [facility] ([facility])    [address] [place] [address], [address]  [facility] [address]   [phone]            Oct 19, 2018  8:00 AM CDT   Cardiac Treatment with Rh Cardiac Rehab 1   [facility])    [address], [address]  [facility] [address]   [phone]            Oct 23, 2018  9:30 AM CDT   Cardiac Treatment with Rh Cardiac Rehab 2   [facility])    [address], [address]  [facility] [address]   [phone]            Oct 24, 2018  8:00 AM CDT   Cardiac Treatment with Rh Cardiac Rehab 1   [facility] ([facility])    [address], [address]  [facility] [address]   [phone]            Oct 26, 2018  8:00 AM CDT   Cardiac Treatment with Rh Cardiac Rehab 1   [facility]  [place] ([facility])    [address], [address]  [facility] [address]   [phone]            Oct 29, 2018  8:00 AM CDT   Cardiac Treatment with Rh Cardiac Rehab 1   [facility] ([facility])    [address], [address]  [facility] [address]   [phone]            Oct 31, 2018  8:00 AM CDT   Cardiac Treatment with Rh Cardiac Rehab 1   [facility] ([facility])    [address], [address]  [facility] [address]   [phone]            Nov 02, 2018  1:00 PM CDT   Cardiac Treatment with Rh Cardiac Rehab 1   [facility] ([facility])    [address], [address]  [facility] [address]   [phone]              Future tests that were ordered for you today     Open Future Orders        Priority Expected Expires Ordered    Basic metabolic panel Routine 1/10/2019 10/12/2019 10/12/2018    Lipid Profile Routine 1/10/2019 10/12/2019 10/12/2018    ALT Routine 1/10/2019 10/12/2019 10/12/2018    Echocardiogram Routine 1/10/2019 10/12/2019 10/12/2018    Follow-Up with Cardiologist Routine 1/10/2019 10/12/2019 10/12/2018    Basic metabolic panel Routine 10/12/2018 10/12/2019 10/12/2018            Who to contact     If you have questions or need follow up information about today's clinic visit or your schedule please contact [facility] directly at [phone].  Normal or non-critical lab and imaging results will be communicated to you by MyChart, letter or phone within 4 business days after the clinic has received the results. If you do not hear from us within 7 days, please contact the clinic through MyChart or phone. If you have a critical or abnormal lab result, we will notify you by phone as soon as possible.  Submit refill requests through [facility] or call your pharmacy and they will forward the refill request to us. Please allow 3  "business days for your refill to be completed.          Additional Information About Your Visit        Care EveryWhere ID     This is your Care EveryWhere ID. This could be used by other organizations to access your [facility] medical records  [id number]        Your Vitals Were     Pulse Height Pulse Oximetry BMI (Body Mass Index)          62 1.676 m (5' 6\") 99% 28.57 kg/m2         Blood Pressure from Last 3 Encounters:   10/12/18 125/69   10/06/18 127/72   08/14/18 128/82    Weight from Last 3 Encounters:   10/12/18 80.3 kg (177 lb)   10/06/18 80.5 kg (177 lb 8 oz)   08/14/18 82.6 kg (182 lb)              We Performed the Following     EKG 12-lead complete w/read - Clinics (performed today)          Today's Medication Changes          These changes are accurate as of 10/12/18  8:45 AM.  If you have any questions, ask your nurse or doctor.               These medicines have changed or have updated prescriptions.        Dose/Directions    metoprolol succinate 25 MG 24 hr tablet   Commonly known as:  TOPROL-XL   This may have changed:  how much to take   Used for:  ST elevation myocardial infarction involving right coronary artery (H)   Changed by:  Cari Thompson PA-C        Dose:  25 mg   Take 1 tablet (25 mg) by mouth daily   Quantity:  30 tablet   Refills:  11       spironolactone 25 MG tablet   Commonly known as:  ALDACTONE   This may have changed:  how much to take   Used for:  ST elevation myocardial infarction involving right coronary artery (H)   Changed by:  Cari Thompson PA-C        Dose:  25 mg   Take 1 tablet (25 mg) by mouth daily   Quantity:  30 tablet   Refills:  11            Where to get your medicines      These medications were sent to [facility] [address] [place] [address] [address] [place] [address] AT [place] [address]  [address] [place] [address]     Phone:  [phone]     metoprolol succinate 25 MG 24 hr tablet    spironolactone 25 MG tablet                " Primary Care Provider Office Phone # Fax #    Lamonte Leslie [phone]       [facility] [address] [place] [address]        Equal Access to Services     CANELO JACQUES : Hadii aad ku hadasho Soomaali, waaxda luqadaha, qaybta kaalmada adeegyada, waxescobar colinn dustin wright laSamirsatya abraham. So [facility] [phone].    ATENCIÓN: Si habla español, tiene a land disposición servicios gratuitos de asistencia lingüística. Llame al [phone].    We comply with applicable federal civil rights laws and [place] laws. We do not discriminate on the basis of race, color, national origin, age, disability, sex, sexual orientation, or gender identity.            Thank you!     Thank you for choosing [facility]  for your care. Our goal is always to provide you with excellent care. Hearing back from our patients is one way we can continue to improve our services. Please take a few minutes to complete the written survey that you may receive in the mail after your visit with us. Thank you!             Your Updated Medication List - Protect others around you: Learn how to safely use, store and throw away your medicines at www.disposemymeds.org.          This list is accurate as of 10/12/18  8:45 AM.  Always use your most recent med list.                   Brand Name Dispense Instructions for use Diagnosis    aspirin 81 MG tablet      Take 81 mg by mouth daily.        atorvastatin 40 MG tablet    LIPITOR    30 tablet    Take 1 tablet (40 mg) by mouth every evening    ST elevation myocardial infarction involving right coronary artery (H)       lisinopril 5 MG tablet    PRINIVIL/ZESTRIL    30 tablet    Take 1 tablet (5 mg) by mouth daily    ST elevation myocardial infarction involving right coronary artery (H)       metoprolol succinate 25 MG 24 hr tablet    TOPROL-XL    30 tablet    Take 1 tablet (25 mg) by mouth daily    ST elevation myocardial infarction involving  right coronary artery (H)       nitroGLYcerin 0.4 MG sublingual tablet    NITROSTAT    25 tablet    For chest pain place 1 tablet under the tongue every 5 minutes for 3 doses. If symptoms persist 5 minutes after 1st dose call 911.    ST elevation myocardial infarction involving right coronary artery (H)       OMEPRAZOLE PO      Take 20 mg by mouth 2 times daily (before meals)        ranitidine 150 MG tablet    ZANTAC     Take 150 mg by mouth 2 times daily        spironolactone 25 MG tablet    ALDACTONE    30 tablet    Take 1 tablet (25 mg) by mouth daily    ST elevation myocardial infarction involving right coronary artery (H)       ticagrelor 90 MG tablet    BRILINTA    60 tablet    Take 1 tablet (90 mg) by mouth every 12 hours    ST elevation myocardial infarction involving right coronary artery (H)

## 2018-10-17 ENCOUNTER — HOSPITAL ENCOUNTER (OUTPATIENT)
Dept: CARDIAC REHAB | Facility: CLINIC | Age: 68
End: 2018-10-17
Attending: INTERNAL MEDICINE
Payer: COMMERCIAL

## 2018-10-17 PROCEDURE — 40000116 ZZH STATISTIC OP CR VISIT: Performed by: OCCUPATIONAL THERAPIST

## 2018-10-17 PROCEDURE — 93798 PHYS/QHP OP CAR RHAB W/ECG: CPT | Performed by: OCCUPATIONAL THERAPIST

## 2018-10-19 ENCOUNTER — HOSPITAL ENCOUNTER (OUTPATIENT)
Dept: CARDIAC REHAB | Facility: CLINIC | Age: 68
End: 2018-10-19
Attending: INTERNAL MEDICINE
Payer: COMMERCIAL

## 2018-10-19 PROCEDURE — 93798 PHYS/QHP OP CAR RHAB W/ECG: CPT

## 2018-10-19 PROCEDURE — 40000116 ZZH STATISTIC OP CR VISIT

## 2018-10-22 ENCOUNTER — HOSPITAL ENCOUNTER (OUTPATIENT)
Dept: CARDIAC REHAB | Facility: CLINIC | Age: 68
End: 2018-10-22
Attending: INTERNAL MEDICINE
Payer: COMMERCIAL

## 2018-10-22 PROCEDURE — 93798 PHYS/QHP OP CAR RHAB W/ECG: CPT

## 2018-10-22 PROCEDURE — 40000116 ZZH STATISTIC OP CR VISIT

## 2018-10-24 ENCOUNTER — HOSPITAL ENCOUNTER (OUTPATIENT)
Dept: CARDIAC REHAB | Facility: CLINIC | Age: 68
End: 2018-10-24
Attending: INTERNAL MEDICINE
Payer: COMMERCIAL

## 2018-10-24 PROCEDURE — 93798 PHYS/QHP OP CAR RHAB W/ECG: CPT

## 2018-10-24 PROCEDURE — 40000116 ZZH STATISTIC OP CR VISIT

## 2018-10-26 ENCOUNTER — HOSPITAL ENCOUNTER (OUTPATIENT)
Dept: CARDIAC REHAB | Facility: CLINIC | Age: 68
End: 2018-10-26
Attending: INTERNAL MEDICINE
Payer: COMMERCIAL

## 2018-10-26 PROCEDURE — 93798 PHYS/QHP OP CAR RHAB W/ECG: CPT | Performed by: OCCUPATIONAL THERAPIST

## 2018-10-26 PROCEDURE — 40000116 ZZH STATISTIC OP CR VISIT: Performed by: OCCUPATIONAL THERAPIST

## 2018-11-05 DIAGNOSIS — I21.11 ST ELEVATION MYOCARDIAL INFARCTION INVOLVING RIGHT CORONARY ARTERY (H): ICD-10-CM

## 2018-11-05 RX ORDER — ATORVASTATIN CALCIUM 40 MG/1
40 TABLET, FILM COATED ORAL EVERY EVENING
Qty: 90 TABLET | Refills: 0 | Status: SHIPPED | OUTPATIENT
Start: 2018-11-05 | End: 2019-01-31

## 2018-11-05 RX ORDER — METOPROLOL SUCCINATE 25 MG/1
25 TABLET, EXTENDED RELEASE ORAL DAILY
Qty: 90 TABLET | Refills: 3 | Status: SHIPPED | OUTPATIENT
Start: 2018-11-05 | End: 2019-10-28

## 2018-11-05 RX ORDER — LISINOPRIL 5 MG/1
5 TABLET ORAL DAILY
Qty: 90 TABLET | Refills: 0 | Status: SHIPPED | OUTPATIENT
Start: 2018-11-05 | End: 2019-01-02 | Stop reason: SINTOL

## 2018-11-05 RX ORDER — SPIRONOLACTONE 25 MG/1
25 TABLET ORAL DAILY
Qty: 90 TABLET | Refills: 3 | Status: SHIPPED | OUTPATIENT
Start: 2018-11-05 | End: 2019-10-28

## 2018-11-14 ENCOUNTER — HOSPITAL ENCOUNTER (OUTPATIENT)
Dept: CARDIAC REHAB | Facility: CLINIC | Age: 68
End: 2018-11-14
Attending: INTERNAL MEDICINE
Payer: COMMERCIAL

## 2018-11-14 PROCEDURE — 40000575 ZZH STATISTIC OP CARDIAC VISIT #2

## 2018-11-14 PROCEDURE — 93797 PHYS/QHP OP CAR RHAB WO ECG: CPT

## 2018-11-14 PROCEDURE — 93798 PHYS/QHP OP CAR RHAB W/ECG: CPT

## 2018-11-14 PROCEDURE — 40000116 ZZH STATISTIC OP CR VISIT

## 2019-01-02 ENCOUNTER — TELEPHONE (OUTPATIENT)
Dept: CARDIOLOGY | Facility: CLINIC | Age: 69
End: 2019-01-02

## 2019-01-02 DIAGNOSIS — I21.11 ST ELEVATION MYOCARDIAL INFARCTION INVOLVING RIGHT CORONARY ARTERY (H): Primary | ICD-10-CM

## 2019-01-02 RX ORDER — LOSARTAN POTASSIUM 50 MG/1
50 TABLET ORAL DAILY
Qty: 90 TABLET | Refills: 3 | Status: SHIPPED | OUTPATIENT
Start: 2019-01-02 | End: 2020-01-23

## 2019-01-02 NOTE — TELEPHONE ENCOUNTER
Received voicemail from pt regarding his lisinopril medication. Pt reports that he has a persistent cough and he regularly takes cough drops. Pt wondering about being change to a different medication.    Pt had recurrent inferior STEMI on 10/4/18.  Cor angio showed  thrombotically occluded mid RCA just proximal to the previous RCA stent, s/p BARBARA overlapping previous stent.   This is when pt was started on lisinopril.   Pt is scheduled to have an echo, labs, and see Dr. Simon on 1/24/19.     Will route to Dr. Simon and call pt back with any recommendations.

## 2019-01-02 NOTE — TELEPHONE ENCOUNTER
Adams Gardiner    Thank you for the note. The cough could be secondary to lisinopril I would recommend he be switched from lisinopril to an equivalent dose of losartan. If the cough persists after making this change ( should see improvement within 3 to 4 days at the latest, then I would advise he see his primary care MD. Thanks

## 2019-01-02 NOTE — TELEPHONE ENCOUNTER
Dr. Simon,     Just wanting to clarify- I should change pt to Losartan 25mg daily?     Thanks,   Zuleyka

## 2019-01-02 NOTE — TELEPHONE ENCOUNTER
Received message from Dr. Simon:  Jeet Simon MD Haley, Leandra K RN   Caller: Unspecified (2 days ago,  4:59 PM)             I would suggest we begin 50 mg daily. Thanks Zuleyka.          Spoke with pt and he agreed to stop taking his lisinopril and will start taking 50mg of Losartan daily. Sent in new prescription.

## 2019-01-24 ENCOUNTER — OFFICE VISIT (OUTPATIENT)
Dept: CARDIOLOGY | Facility: CLINIC | Age: 69
End: 2019-01-24
Attending: PHYSICIAN ASSISTANT
Payer: COMMERCIAL

## 2019-01-24 ENCOUNTER — HOSPITAL ENCOUNTER (OUTPATIENT)
Dept: CARDIOLOGY | Facility: CLINIC | Age: 69
Discharge: HOME | End: 2019-01-24
Attending: PHYSICIAN ASSISTANT | Admitting: PHYSICIAN ASSISTANT
Payer: COMMERCIAL

## 2019-01-24 VITALS
HEART RATE: 60 BPM | HEIGHT: 66 IN | SYSTOLIC BLOOD PRESSURE: 100 MMHG | WEIGHT: 177.4 LBS | DIASTOLIC BLOOD PRESSURE: 68 MMHG | BODY MASS INDEX: 28.51 KG/M2

## 2019-01-24 DIAGNOSIS — I21.11 ST ELEVATION MYOCARDIAL INFARCTION INVOLVING RIGHT CORONARY ARTERY (H): ICD-10-CM

## 2019-01-24 DIAGNOSIS — I25.10 CORONARY ARTERY DISEASE INVOLVING NATIVE CORONARY ARTERY OF NATIVE HEART WITHOUT ANGINA PECTORIS: Primary | ICD-10-CM

## 2019-01-24 LAB
ALT SERPL W P-5'-P-CCNC: 33 U/L (ref 0–70)
ANION GAP SERPL CALCULATED.3IONS-SCNC: 5 MMOL/L (ref 3–14)
BUN SERPL-MCNC: 18 MG/DL (ref 7–30)
CALCIUM SERPL-MCNC: 8.5 MG/DL (ref 8.5–10.1)
CHLORIDE SERPL-SCNC: 106 MMOL/L (ref 94–109)
CHOLEST SERPL-MCNC: 121 MG/DL
CO2 SERPL-SCNC: 27 MMOL/L (ref 20–32)
CREAT SERPL-MCNC: 1.11 MG/DL (ref 0.66–1.25)
GFR SERPL CREATININE-BSD FRML MDRD: 68 ML/MIN/{1.73_M2}
GLUCOSE SERPL-MCNC: 89 MG/DL (ref 70–99)
HDLC SERPL-MCNC: 60 MG/DL
LDLC SERPL CALC-MCNC: 49 MG/DL
NONHDLC SERPL-MCNC: 61 MG/DL
POTASSIUM SERPL-SCNC: 4.1 MMOL/L (ref 3.4–5.3)
SODIUM SERPL-SCNC: 138 MMOL/L (ref 133–144)
TRIGL SERPL-MCNC: 59 MG/DL

## 2019-01-24 PROCEDURE — 25500064 ZZH RX 255 OP 636: Performed by: PHYSICIAN ASSISTANT

## 2019-01-24 PROCEDURE — 40000264 ECHOCARDIOGRAM COMPLETE

## 2019-01-24 PROCEDURE — 93306 TTE W/DOPPLER COMPLETE: CPT | Mod: 26 | Performed by: INTERNAL MEDICINE

## 2019-01-24 PROCEDURE — 80061 LIPID PANEL: CPT | Performed by: PHYSICIAN ASSISTANT

## 2019-01-24 PROCEDURE — 99214 OFFICE O/P EST MOD 30 MIN: CPT | Mod: 25 | Performed by: INTERNAL MEDICINE

## 2019-01-24 PROCEDURE — 84460 ALANINE AMINO (ALT) (SGPT): CPT | Performed by: PHYSICIAN ASSISTANT

## 2019-01-24 PROCEDURE — 80048 BASIC METABOLIC PNL TOTAL CA: CPT | Performed by: PHYSICIAN ASSISTANT

## 2019-01-24 RX ADMIN — HUMAN ALBUMIN MICROSPHERES AND PERFLUTREN 3 ML: 10; .22 INJECTION, SOLUTION INTRAVENOUS at 11:10

## 2019-01-24 ASSESSMENT — MIFFLIN-ST. JEOR: SCORE: 1522.43

## 2019-01-24 NOTE — LETTER
1/24/2019    CHRISTIN OLIVERA  [facility] [address]   [place] [address]    RE: Hardy Landry       Dear Colleague,    I had the pleasure of seeing Hardy Landry in the [facility].    HISTORY OF PRESENT ILLNESS:  IWSTEMI 10/2018 sp successful PCI RCA with BARBARA. LAD and CX ok. LVEF 50 to 55%. IL HK. No symptoms since then. Continues to exercise.Toelrating medications.     Orders this Visit:  No orders of the defined types were placed in this encounter.    No orders of the defined types were placed in this encounter.    There are no discontinued medications.    Encounter Diagnosis   Name Primary?     ST elevation myocardial infarction involving right coronary artery (H)        CURRENT MEDICATIONS:  Current Outpatient Medications   Medication Sig Dispense Refill     aspirin 81 MG tablet Take 81 mg by mouth daily.       atorvastatin (LIPITOR) 40 MG tablet Take 1 tablet (40 mg) by mouth every evening 90 tablet 0     losartan (COZAAR) 50 MG tablet Take 1 tablet (50 mg) by mouth daily 90 tablet 3     metoprolol succinate (TOPROL-XL) 25 MG 24 hr tablet Take 1 tablet (25 mg) by mouth daily 90 tablet 3     nitroGLYcerin (NITROSTAT) 0.4 MG sublingual tablet For chest pain place 1 tablet under the tongue every 5 minutes for 3 doses. If symptoms persist 5 minutes after 1st dose call 911. 25 tablet 0     OMEPRAZOLE PO Take 20 mg by mouth 2 times daily (before meals)        ranitidine (ZANTAC) 150 MG tablet Take 150 mg by mouth 2 times daily       spironolactone (ALDACTONE) 25 MG tablet Take 1 tablet (25 mg) by mouth daily 90 tablet 3     ticagrelor (BRILINTA) 90 MG tablet Take 1 tablet (90 mg) by mouth every 12 hours 200 tablet 3       ALLERGIES   No Known Allergies    PAST MEDICAL, SURGICAL, FAMILY, SOCIAL HISTORY:  History was reviewed and updated as needed, see medical record.    Review of Systems:  A 12-point review of systems was completed, see medical record for detailed  "review of systems information.    Physical Exam:  Vitals: /68 (BP Location: Right arm, Patient Position: Sitting, Cuff Size: Adult Regular)   Pulse 60   Ht 1.676 m (5' 6\")   Wt 80.5 kg (177 lb 6.4 oz)   BMI 28.63 kg/m       Constitutional:           Skin:           Head:           Eyes:           ENT:           Neck:           Chest:           Cardiac:                    Abdomen:           Vascular:                                        Extremities and Back:           Neurological:           ASSESSMENT: stable. Doing well on present therapy.        RECOMMENDATIONS: Follow-up 6 months      Recent Lab Results:  LIPID RESULTS:  Lab Results   Component Value Date    CHOL 121 01/24/2019    HDL 60 01/24/2019    LDL 49 01/24/2019    TRIG 59 01/24/2019    CHOLHDLRATIO 2.5 11/12/2010       LIVER ENZYME RESULTS:  Lab Results   Component Value Date    ALT 33 01/24/2019       CBC RESULTS:  Lab Results   Component Value Date    WBC 10.4 10/05/2018    RBC 4.17 (L) 10/05/2018    HGB 13.2 (L) 10/05/2018    HCT 38.2 (L) 10/05/2018    MCV 92 10/05/2018    MCH 31.7 10/05/2018    MCHC 34.6 10/05/2018    RDW 13.0 10/05/2018     10/05/2018       BMP RESULTS:  Lab Results   Component Value Date     01/24/2019    POTASSIUM 4.1 01/24/2019    CHLORIDE 106 01/24/2019    CO2 27 01/24/2019    ANIONGAP 5 01/24/2019    GLC 89 01/24/2019    BUN 18 01/24/2019    CR 1.11 01/24/2019    GFRESTIMATED 68 01/24/2019    GFRESTBLACK 79 01/24/2019    MANNIE 8.5 01/24/2019        A1C RESULTS:  No results found for: A1C    INR RESULTS:  Lab Results   Component Value Date    INR  11/28/2012     Charge credited   Test canceled by PCU/Clinic  PRIMO  CORRECTED ON 11/28 AT 0911: PREVIOUSLY REPORTED AS 0.96       We greatly appreciate the opportunity to be involved in the care of your patient, Hardy Landry.    Sincerely,  Jeet Simon MD      CC  Cari Thompson, PA-C  [address] [place]  [place], [place] " [address]                                                                       Thank you for allowing me to participate in the care of your patient.      Sincerely,     Jeet Simon MD     [facility] [place]    cc:   Cari Thompson PA-C  [address] [place] [address]

## 2019-01-24 NOTE — LETTER
1/24/2019      LAMONTE OLIVERA  [facility] [address]   [place] [address]      RE: Hardy Landry       Dear Colleague,    I had the pleasure of seeing Hardy Landry in the [facility].    Service Date: 01/24/2019      PRIMARY CARE PHYSICIAN:  Lamonte Olivera MD.      HISTORY OF PRESENT ILLNESS:  Hardy Landry, a 68-year-old man with coronary artery disease and dyslipidemia was seen today at your request for followup of recent inferior wall ST segment elevation MI.      On 10/04/2018, the patient presented to the [facility] emergency room with malaise and chest discomfort.  His ECG showed evolving inferior wall ST segment elevation MI.  Coronary angiography, left heart catheterization, and left ventriculogram performed via the right radial artery by Dr. Verma.  The left main had no significant narrowing.  The LAD and nondominant CX  Exhibited nonobstructive  atheromatous change with patent coronary stents.  The dominant right coronary had a new 100% midvessel occlusion proximal to the site of his last RCA stent implantation 8 years ago..  Ejection fraction was 40%-45% with inferolateral wall hypokinesis.  The patient underwent  implantation of a 3.5 x 32 mm length everolimus-eluting stent in the RCA with excellent angiographic results.   He was discharged on aspirin, Ticagrelor, lisinopril, atorvastatin, metoprolol and spironolactone.      Mr. Landry was last seen by LUIZ Fong in our clinic on 10/12/2018.  Cari ordered an echocardiogram and followup visit with me today.      Since last seen, the patient denies chest, arm, neck, jaw or back discomfort with activity.  He  completed cardiac rehabilitation ,  resumed a regular exercise program, and follows a  Mediterranean style diet.  His echo today shows an ejection fraction of 50%-55% with inferolateral wall hypokinesis, improved since his last study.      PAST MEDICAL HISTORY:   1.  Coronary artery disease.    a.  Inferolateral wall myocardial infarction in 2010, treated with implantation of drug-eluting stent in circumflex.   b.  Status post balloon angioplasty of mid LAD in 2010.   c.  Status post stent implantation in distal right coronary in 2010.   d.  Angiography in 2012 showing patency at all intervention sites with normal ejection fraction.   e.  Recent inferior wall ST segment elevation MI 10/04/2018.  Status post implantation of 3.5 x 32 mm length everolimus-eluting stent in mid right coronary.  No significant narrowing in left main, LAD, or circumflex.  Current ejection fraction of 50%-55%.   2.  Dyslipidemia - on atorvastatin.      PHYSICAL EXAMINATION:   GENERAL:  Exam today demonstrates a very friendly, cooperative and intelligent 68-year-old man.   VITAL SIGNS:  His blood pressure is 100/68.  His heart rate is 60.  His height is 1.7 meters, his weight is 80.5 kg, his BMI is 28.7.   LUNGS:  Clear to percussion and auscultation.   CARDIOVASCULAR:  Shows a normal S1 with a normal S2.  There is no S3.  There is no murmur, rub or click.      LABORATORY STUDIES:  His echo showed an ejection fraction of 50%-55% with inferolateral wall hypokinesis.  There is no significant mitral insufficiency.  His cholesterol profile shows an LDL cholesterol of 49.  His potassium is 4.1.      ASSESSMENT:  Mr. Landry is asymptomatic on guideline-directed medical therapy with optimal systolic blood pressure and LDL cholesterol levels.  I am pleased to see the interval  improvement in left ventricular systolic performance since his last echo.  I would recommend that we continue present medical therapy and follow up in about 6 months.      RECOMMENDATIONS:   1.  Continue regular exercise, Mediterranean style diet.   2.  Continue present medical therapy.   3.  Followup visit in about 6 months.      We greatly appreciate the opportunity to see your patient, Mr. Hardy Landry.      cc:   Lamonte Leslie MD   [facility]     [address] [id number]         HILARIO SIMON MD             D: 2019   T: 2019   MT: MOHINI      Name:     RAE MORAES   MRN:      [id number]        Account:      [id number]   :      1950           Service Date: 2019      Document: [id number]           Outpatient Encounter Medications as of 2019   Medication Sig Dispense Refill     aspirin 81 MG tablet Take 81 mg by mouth daily.       atorvastatin (LIPITOR) 40 MG tablet Take 1 tablet (40 mg) by mouth every evening 90 tablet 0     losartan (COZAAR) 50 MG tablet Take 1 tablet (50 mg) by mouth daily 90 tablet 3     metoprolol succinate (TOPROL-XL) 25 MG 24 hr tablet Take 1 tablet (25 mg) by mouth daily 90 tablet 3     nitroGLYcerin (NITROSTAT) 0.4 MG sublingual tablet For chest pain place 1 tablet under the tongue every 5 minutes for 3 doses. If symptoms persist 5 minutes after 1st dose call 911. 25 tablet 0     OMEPRAZOLE PO Take 20 mg by mouth 2 times daily (before meals)        ranitidine (ZANTAC) 150 MG tablet Take 150 mg by mouth 2 times daily       spironolactone (ALDACTONE) 25 MG tablet Take 1 tablet (25 mg) by mouth daily 90 tablet 3     ticagrelor (BRILINTA) 90 MG tablet Take 1 tablet (90 mg) by mouth every 12 hours 200 tablet 3     [] perflutren diluted 1mL to 2mL with saline (OPTISON) diluted injection 3 mL        [] sodium chloride (PF) 0.9% PF flush 10 mL        No facility-administered encounter medications on file as of 2019.        Again, thank you for allowing me to participate in the care of your patient.      Sincerely,    Hilario Simon MD     [facility]

## 2019-01-24 NOTE — PROGRESS NOTES
Service Date: 01/24/2019      PRIMARY CARE PHYSICIAN:  Lamonte Leslie MD.      HISTORY OF PRESENT ILLNESS:  Hardy Landry, a 68-year-old man with coronary artery disease and dyslipidemia was seen today at your request for followup of recent inferior wall ST segment elevation MI.      On 10/04/2018, the patient presented to the [facility] emergency room with malaise and chest discomfort.  His ECG showed evolving inferior wall ST segment elevation MI.  Coronary angiography, left heart catheterization, and left ventriculogram performed via the right radial artery by Dr. Verma.  The left main had no significant narrowing.  The LAD and nondominant CX  Exhibited nonobstructive  atheromatous change with patent coronary stents.  The dominant right coronary had a new 100% midvessel occlusion proximal to the site of his last RCA stent implantation 8 years ago..  Ejection fraction was 40%-45% with inferolateral wall hypokinesis.  The patient underwent  implantation of a 3.5 x 32 mm length everolimus-eluting stent in the RCA with excellent angiographic results.   He was discharged on aspirin, Ticagrelor, lisinopril, atorvastatin, metoprolol and spironolactone.      Mr. Landry was last seen by LUIZ Fong in our clinic on 10/12/2018.  Cari ordered an echocardiogram and followup visit with me today.      Since last seen, the patient denies chest, arm, neck, jaw or back discomfort with activity.  He  completed cardiac rehabilitation ,  resumed a regular exercise program, and follows a  Mediterranean style diet.  His echo today shows an ejection fraction of 50%-55% with inferolateral wall hypokinesis, improved since his last study.      PAST MEDICAL HISTORY:   1.  Coronary artery disease.   a.  Inferolateral wall myocardial infarction in 2010, treated with implantation of drug-eluting stent in circumflex.   b.  Status post balloon angioplasty of mid LAD in 2010.   c.  Status post stent implantation in distal right coronary  in .   d.  Angiography in  showing patency at all intervention sites with normal ejection fraction.   e.  Recent inferior wall ST segment elevation MI 10/04/2018.  Status post implantation of 3.5 x 32 mm length everolimus-eluting stent in mid right coronary.  No significant narrowing in left main, LAD, or circumflex.  Current ejection fraction of 50%-55%.   2.  Dyslipidemia - on atorvastatin.      PHYSICAL EXAMINATION:   GENERAL:  Exam today demonstrates a very friendly, cooperative and intelligent 68-year-old man.   VITAL SIGNS:  His blood pressure is 100/68.  His heart rate is 60.  His height is 1.7 meters, his weight is 80.5 kg, his BMI is 28.7.   LUNGS:  Clear to percussion and auscultation.   CARDIOVASCULAR:  Shows a normal S1 with a normal S2.  There is no S3.  There is no murmur, rub or click.      LABORATORY STUDIES:  His echo showed an ejection fraction of 50%-55% with inferolateral wall hypokinesis.  There is no significant mitral insufficiency.  His cholesterol profile shows an LDL cholesterol of 49.  His potassium is 4.1.      ASSESSMENT:  Mr. Landry is asymptomatic on guideline-directed medical therapy with optimal systolic blood pressure and LDL cholesterol levels.  I am pleased to see the interval  improvement in left ventricular systolic performance since his last echo.  I would recommend that we continue present medical therapy and follow up in about 6 months.      RECOMMENDATIONS:   1.  Continue regular exercise, Mediterranean style diet.   2.  Continue present medical therapy.   3.  Followup visit in about 6 months.      We greatly appreciate the opportunity to see your patient, Mr. Rae Landry.      cc:   Lamonte Leslie MD   [facility] [address] [place] [address]         HILARIO CORNELIUS MD             D: 2019   T: 2019   MT: MOHINI      Name:     RAE LANDRY   MRN:      [id number]        Account:      [id number]   :      1950            Service Date: 01/24/2019      Document: [id number]

## 2019-01-24 NOTE — PROGRESS NOTES
"HISTORY OF PRESENT ILLNESS:  IWSTEMI 10/2018 sp successful PCI RCA with BARBARA. LAD and CX ok. LVEF 50 to 55%. IL HK. No symptoms since then. Continues to exercise.Toelrating medications.     Orders this Visit:  No orders of the defined types were placed in this encounter.    No orders of the defined types were placed in this encounter.    There are no discontinued medications.    Encounter Diagnosis   Name Primary?     ST elevation myocardial infarction involving right coronary artery (H)        CURRENT MEDICATIONS:  Current Outpatient Medications   Medication Sig Dispense Refill     aspirin 81 MG tablet Take 81 mg by mouth daily.       atorvastatin (LIPITOR) 40 MG tablet Take 1 tablet (40 mg) by mouth every evening 90 tablet 0     losartan (COZAAR) 50 MG tablet Take 1 tablet (50 mg) by mouth daily 90 tablet 3     metoprolol succinate (TOPROL-XL) 25 MG 24 hr tablet Take 1 tablet (25 mg) by mouth daily 90 tablet 3     nitroGLYcerin (NITROSTAT) 0.4 MG sublingual tablet For chest pain place 1 tablet under the tongue every 5 minutes for 3 doses. If symptoms persist 5 minutes after 1st dose call 911. 25 tablet 0     OMEPRAZOLE PO Take 20 mg by mouth 2 times daily (before meals)        ranitidine (ZANTAC) 150 MG tablet Take 150 mg by mouth 2 times daily       spironolactone (ALDACTONE) 25 MG tablet Take 1 tablet (25 mg) by mouth daily 90 tablet 3     ticagrelor (BRILINTA) 90 MG tablet Take 1 tablet (90 mg) by mouth every 12 hours 200 tablet 3       ALLERGIES   No Known Allergies    PAST MEDICAL, SURGICAL, FAMILY, SOCIAL HISTORY:  History was reviewed and updated as needed, see medical record.    Review of Systems:  A 12-point review of systems was completed, see medical record for detailed review of systems information.    Physical Exam:  Vitals: /68 (BP Location: Right arm, Patient Position: Sitting, Cuff Size: Adult Regular)   Pulse 60   Ht 1.676 m (5' 6\")   Wt 80.5 kg (177 lb 6.4 oz)   BMI 28.63 kg/m  "     Constitutional:           Skin:           Head:           Eyes:           ENT:           Neck:           Chest:           Cardiac:                    Abdomen:           Vascular:                                        Extremities and Back:           Neurological:           ASSESSMENT: stable. Doing well on present therapy.        RECOMMENDATIONS: Follow-up 6 months      Recent Lab Results:  LIPID RESULTS:  Lab Results   Component Value Date    CHOL 121 01/24/2019    HDL 60 01/24/2019    LDL 49 01/24/2019    TRIG 59 01/24/2019    CHOLHDLRATIO 2.5 11/12/2010       LIVER ENZYME RESULTS:  Lab Results   Component Value Date    ALT 33 01/24/2019       CBC RESULTS:  Lab Results   Component Value Date    WBC 10.4 10/05/2018    RBC 4.17 (L) 10/05/2018    HGB 13.2 (L) 10/05/2018    HCT 38.2 (L) 10/05/2018    MCV 92 10/05/2018    MCH 31.7 10/05/2018    MCHC 34.6 10/05/2018    RDW 13.0 10/05/2018     10/05/2018       BMP RESULTS:  Lab Results   Component Value Date     01/24/2019    POTASSIUM 4.1 01/24/2019    CHLORIDE 106 01/24/2019    CO2 27 01/24/2019    ANIONGAP 5 01/24/2019    GLC 89 01/24/2019    BUN 18 01/24/2019    CR 1.11 01/24/2019    GFRESTIMATED 68 01/24/2019    GFRESTBLACK 79 01/24/2019    MANNIE 8.5 01/24/2019        A1C RESULTS:  No results found for: A1C    INR RESULTS:  Lab Results   Component Value Date    INR  11/28/2012     Charge credited   Test canceled by PCU/Clinic  [facility]  CORRECTED ON 11/28 AT 0911: PREVIOUSLY REPORTED AS 0.96       We greatly appreciate the opportunity to be involved in the care of your patient, Hardy Landry.    Sincerely,  Jeet Simon MD      CC  Cari Thompson, PA-C  [address] [place] [place] [address]

## 2019-01-31 DIAGNOSIS — I21.11 ST ELEVATION MYOCARDIAL INFARCTION INVOLVING RIGHT CORONARY ARTERY (H): ICD-10-CM

## 2019-01-31 RX ORDER — ATORVASTATIN CALCIUM 40 MG/1
40 TABLET, FILM COATED ORAL EVERY EVENING
Qty: 90 TABLET | Refills: 3 | Status: SHIPPED | OUTPATIENT
Start: 2019-01-31 | End: 2020-01-31

## 2019-08-15 ENCOUNTER — OFFICE VISIT (OUTPATIENT)
Dept: CARDIOLOGY | Facility: CLINIC | Age: 69
End: 2019-08-15
Payer: COMMERCIAL

## 2019-08-15 VITALS
HEIGHT: 66 IN | SYSTOLIC BLOOD PRESSURE: 126 MMHG | DIASTOLIC BLOOD PRESSURE: 70 MMHG | BODY MASS INDEX: 29.09 KG/M2 | HEART RATE: 50 BPM | WEIGHT: 181 LBS

## 2019-08-15 DIAGNOSIS — I25.10 CORONARY ARTERY DISEASE INVOLVING NATIVE CORONARY ARTERY OF NATIVE HEART WITHOUT ANGINA PECTORIS: Primary | ICD-10-CM

## 2019-08-15 DIAGNOSIS — E78.00 HYPERCHOLESTEREMIA: ICD-10-CM

## 2019-08-15 PROCEDURE — 99214 OFFICE O/P EST MOD 30 MIN: CPT | Performed by: INTERNAL MEDICINE

## 2019-08-15 RX ORDER — CLOPIDOGREL BISULFATE 75 MG/1
75 TABLET ORAL DAILY
Qty: 60 TABLET | Refills: 0 | Status: SHIPPED | OUTPATIENT
Start: 2019-08-15

## 2019-08-15 ASSESSMENT — MIFFLIN-ST. JEOR: SCORE: 1533.76

## 2019-08-15 NOTE — LETTER
8/15/2019    CHRISTIN OLIVERA  [facility] [address]   [place] [address]    RE: Hardy Landry       Dear Colleague,    I had the pleasure of seeing Hardy Landry in the [facility].    HISTORY OF PRESENT ILLNESS:  Asymptomatic. Planning to hunt deer this Fall. Playing tennis and basketball, lifting weights without symptoms. Finds Brillanta expensive. Wants to complete one year clopidogrel then stay on asa alone.    Orders this Visit:  No orders of the defined types were placed in this encounter.    No orders of the defined types were placed in this encounter.    There are no discontinued medications.    No diagnosis found.    CURRENT MEDICATIONS:  Current Outpatient Medications   Medication Sig Dispense Refill     aspirin 81 MG tablet Take 81 mg by mouth daily.       atorvastatin (LIPITOR) 40 MG tablet Take 1 tablet (40 mg) by mouth every evening 90 tablet 3     losartan (COZAAR) 50 MG tablet Take 1 tablet (50 mg) by mouth daily 90 tablet 3     metoprolol succinate (TOPROL-XL) 25 MG 24 hr tablet Take 1 tablet (25 mg) by mouth daily 90 tablet 3     nitroGLYcerin (NITROSTAT) 0.4 MG sublingual tablet For chest pain place 1 tablet under the tongue every 5 minutes for 3 doses. If symptoms persist 5 minutes after 1st dose call 911. 25 tablet 0     OMEPRAZOLE PO Take 20 mg by mouth 2 times daily (before meals)        ranitidine (ZANTAC) 150 MG tablet Take 150 mg by mouth 2 times daily       spironolactone (ALDACTONE) 25 MG tablet Take 1 tablet (25 mg) by mouth daily 90 tablet 3     ticagrelor (BRILINTA) 90 MG tablet Take 1 tablet (90 mg) by mouth every 12 hours 200 tablet 3       ALLERGIES   No Known Allergies    PAST MEDICAL, SURGICAL, FAMILY, SOCIAL HISTORY:  History was reviewed and updated as needed, see medical record.    Review of Systems:  A 12-point review of systems was completed, see medical record for detailed review of systems information.    Physical  "Exam:  Vitals: /70   Pulse 50   Ht 1.676 m (5' 6\")   Wt 82.1 kg (181 lb)   BMI 29.21 kg/m       Constitutional:           Skin:           Head:           Eyes:           ENT:           Neck:           Chest:           Cardiac:                    Abdomen:           Vascular:                                        Extremities and Back:           Neurological:           ASSESSMENT: Stable.        RECOMMENDATIONS: change to plavix to complete one year of therapy.        Recent Lab Results:  LIPID RESULTS:  Lab Results   Component Value Date    CHOL 121 01/24/2019    HDL 60 01/24/2019    LDL 49 01/24/2019    TRIG 59 01/24/2019    CHOLHDLRATIO 2.5 11/12/2010       LIVER ENZYME RESULTS:  Lab Results   Component Value Date    ALT 33 01/24/2019       CBC RESULTS:  Lab Results   Component Value Date    WBC 10.4 10/05/2018    RBC 4.17 (L) 10/05/2018    HGB 13.2 (L) 10/05/2018    HCT 38.2 (L) 10/05/2018    MCV 92 10/05/2018    MCH 31.7 10/05/2018    MCHC 34.6 10/05/2018    RDW 13.0 10/05/2018     10/05/2018       BMP RESULTS:  Lab Results   Component Value Date     01/24/2019    POTASSIUM 4.1 01/24/2019    CHLORIDE 106 01/24/2019    CO2 27 01/24/2019    ANIONGAP 5 01/24/2019    GLC 89 01/24/2019    BUN 18 01/24/2019    CR 1.11 01/24/2019    GFRESTIMATED 68 01/24/2019    GFRESTBLACK 79 01/24/2019    MANNIE 8.5 01/24/2019        A1C RESULTS:  No results found for: A1C    INR RESULTS:  Lab Results   Component Value Date    INR  11/28/2012     Charge credited   Test canceled by PCU/Clinic  PRIMO  CORRECTED ON 11/28 AT 0911: PREVIOUSLY REPORTED AS 0.96       We greatly appreciate the opportunity to be involved in the care of your patient, Hardy REDDY [place].    Sincerely,  Jeet Simon MD        Lamonte Leslie  [facility] [address] [place] [address]                                                                       Service Date: 08/15/2019      HISTORY OF PRESENT ILLNESS:  Hardy BENSON" Gris, a 69-year-old man with coronary artery disease, dyslipidemia and Clarke esophagus was seen today at your request for followup.      Since last seen, Mr. Landry remains free of chest, arm, neck, jaw or back discomfort with exertion.  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      The patient finds ticagrelor very expensive, and would like to switch to clopidogrel.  He is scheduled for esophagoscopy and possible biopsy for treatment of Clarke esophagus this fall.  As yet, his gastroenterologists have held off on biopsies as they have not want to interrupt his dual antiplatelet therapy.      PAST MEDICAL HISTORY:   1.  Inferior lateral wall STEMI 2010, treated with implantation of drug-eluting stent in circumflex.   a.  Status post balloon angioplasty of mid LAD in 2010.   b.  Status post stent implantation in distal right coronary in 2010.   c.  Inferior wall myocardial infarction 10/2018, status post implantation of 3.5 x 32 mm length everolimus-eluting stent in the mid right coronary.  No significant narrowing in left main, LAD or circumflex.  Ejection fraction of 55%.   2.  Dyslipidemia, on atorvastatin.   3.  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      PHYSICAL EXAMINATION:   GENERAL:  Exam today demonstrates a very friendly, cooperative and intelligent 69-year-old man.   VITAL SIGNS:  His blood pressure is 126/70, his heart rate is 50.  His height is 1.7 meters, his weight is 82.1 kg.  BMI is 29.21.   LUNGS:  Clear to percussion and auscultation.   CARDIOVASCULAR:  Shows a normal S1 with a normal S2.  There is no S3.  There is no murmur, rub or click.      LABORATORY STUDIES:  His last lipid profile in 01/2019 showed an LDL cholesterol 49 with a cholesterol of 121.      ASSESSMENT:  Mr. Landry is asymptomatic with optimal systolic blood pressure and lipid levels on guideline-directed medical therapy.  Given the patient's concerns about the  expense of ticagrelor, I would recommend we switch him to clopidogrel 75 mg daily.      We had a long discussion today about the risks and benefits of long-term dual antiplatelet therapy for the reduction of recurrent stent thrombosis and myocardial infarction.  I explained the results of the DAPT trial which suggested that dual antiplatelet therapy may reduce the risk of recurrent stent thrombosis and MI, but does not prolong survival and will increase the potential risk of bleeding..  In general, I would favor continued dual antiplatelet therapy long-term, but the patient is adamant about staying on aspirin alone.  He has agreed to continue clopidogrel to complete 1 year of therapy in 2019.      I believe the best timing for his esophageal procedure would be early  2019 after completion of one year DAPT. He should resume aspirin 81 mg daily as soon as permitted by his gastroenterologist after the procedure is completed.      RECOMMENDATIONS:   1.  Continue present medical therapy.   2.  Switch from ticagrelor to clopidogrel 75 mg daily to complete 1 year of therapy in 2019.   3.  Delay esophagoscopy with biopsy until the patient is off dual antiplatelet therapy.  if his aspirin is stopped before the procedure, it should be resumed as soon as acceptable to the gastroenterologist after his procedure.   4.  Followup visit with me in about 1 year.   5.  Continue current medical therapy and lifestyle intervention.      We greatly appreciate the opportunity to participate in the care of your patient, Mr. Rae Landry.      cc:      Lamonte Leslie MD    [facility] [address] [place] [address]         HILARIO CORNELIUS MD             D: 08/15/2019   T: 08/15/2019   MT: JON      Name:     RAE LANDRY   MRN:      [id number]        Account:      [id number]   :      1950           Service Date: 08/15/2019      Document: [id number]        Thank you for allowing  me to participate in the care of your patient.    Sincerely,     Jeet Simon MD     [facility]

## 2019-08-15 NOTE — PROGRESS NOTES
Service Date: 08/15/2019      HISTORY OF PRESENT ILLNESS:  Hardy Landry, a 69-year-old man with coronary artery disease, dyslipidemia and Clarke esophagus was seen today at your request for followup.      Since last seen, Mr. Landry remains free of chest, arm, neck, jaw or back discomfort with exertion.  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      The patient finds ticagrelor very expensive, and would like to switch to clopidogrel.  He is scheduled for esophagoscopy and possible biopsy for treatment of Clarke esophagus this fall.  As yet, his gastroenterologists have held off on biopsies as they have not want to interrupt his dual antiplatelet therapy.      PAST MEDICAL HISTORY:   1.  Inferior lateral wall STEMI 2010, treated with implantation of drug-eluting stent in circumflex.   a.  Status post balloon angioplasty of mid LAD in 2010.   b.  Status post stent implantation in distal right coronary in 2010.   c.  Inferior wall myocardial infarction 10/2018, status post implantation of 3.5 x 32 mm length everolimus-eluting stent in the mid right coronary.  No significant narrowing in left main, LAD or circumflex.  Ejection fraction of 55%.   2.  Dyslipidemia, on atorvastatin.   3.  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      PHYSICAL EXAMINATION:   GENERAL:  Exam today demonstrates a very friendly, cooperative and intelligent 69-year-old man.   VITAL SIGNS:  His blood pressure is 126/70, his heart rate is 50.  His height is 1.7 meters, his weight is 82.1 kg.  BMI is 29.21.   LUNGS:  Clear to percussion and auscultation.   CARDIOVASCULAR:  Shows a normal S1 with a normal S2.  There is no S3.  There is no murmur, rub or click.      LABORATORY STUDIES:  His last lipid profile in 01/2019 showed an LDL cholesterol 49 with a cholesterol of 121.      ASSESSMENT:  Mr. Lnadry is asymptomatic with optimal systolic blood pressure and lipid levels on  guideline-directed medical therapy.  Given the patient's concerns about the expense of ticagrelor, I would recommend we switch him to clopidogrel 75 mg daily.      We had a long discussion today about the risks and benefits of long-term dual antiplatelet therapy for the reduction of recurrent stent thrombosis and myocardial infarction.  I explained the results of the DAPT trial which suggested that dual antiplatelet therapy may reduce the risk of recurrent stent thrombosis and MI, but does not prolong survival and will increase the potential risk of bleeding..  In general, I would favor continued dual antiplatelet therapy long-term, but the patient is adamant about staying on aspirin alone.  He has agreed to continue clopidogrel to complete 1 year of therapy in 2019.      I believe the best timing for his esophageal procedure would be early  2019 after completion of one year DAPT. He should resume aspirin 81 mg daily as soon as permitted by his gastroenterologist after the procedure is completed.      RECOMMENDATIONS:   1.  Continue present medical therapy.   2.  Switch from ticagrelor to clopidogrel 75 mg daily to complete 1 year of therapy in 2019.   3.  Delay esophagoscopy with biopsy until the patient is off dual antiplatelet therapy.  if his aspirin is stopped before the procedure, it should be resumed as soon as acceptable to the gastroenterologist after his procedure.   4.  Followup visit with me in about 1 year.   5.  Continue current medical therapy and lifestyle intervention.      We greatly appreciate the opportunity to participate in the care of your patient, Mr. Rae Landry.      cc:      Lamonte Leslie MD    [facility]   [address] [place] [address]         HILARIO CORNELIUS MD             D: 08/15/2019   T: 08/15/2019   MT: JON      Name:     RAE LANDRY   MRN:      [id number]        Account:      [id number]   :      1950            Service Date: 08/15/2019      Document: [id number]

## 2019-08-15 NOTE — PROGRESS NOTES
"HISTORY OF PRESENT ILLNESS:  Asymptomatic. Planning to hunt deer this Fall. Playing tennis and basketball, lifting weights without symptoms. Finds Brillanta expensive. Wants to complete one year clopidogrel then stay on asa alone.    Orders this Visit:  No orders of the defined types were placed in this encounter.    No orders of the defined types were placed in this encounter.    There are no discontinued medications.    No diagnosis found.    CURRENT MEDICATIONS:  Current Outpatient Medications   Medication Sig Dispense Refill     aspirin 81 MG tablet Take 81 mg by mouth daily.       atorvastatin (LIPITOR) 40 MG tablet Take 1 tablet (40 mg) by mouth every evening 90 tablet 3     losartan (COZAAR) 50 MG tablet Take 1 tablet (50 mg) by mouth daily 90 tablet 3     metoprolol succinate (TOPROL-XL) 25 MG 24 hr tablet Take 1 tablet (25 mg) by mouth daily 90 tablet 3     nitroGLYcerin (NITROSTAT) 0.4 MG sublingual tablet For chest pain place 1 tablet under the tongue every 5 minutes for 3 doses. If symptoms persist 5 minutes after 1st dose call 911. 25 tablet 0     OMEPRAZOLE PO Take 20 mg by mouth 2 times daily (before meals)        ranitidine (ZANTAC) 150 MG tablet Take 150 mg by mouth 2 times daily       spironolactone (ALDACTONE) 25 MG tablet Take 1 tablet (25 mg) by mouth daily 90 tablet 3     ticagrelor (BRILINTA) 90 MG tablet Take 1 tablet (90 mg) by mouth every 12 hours 200 tablet 3       ALLERGIES   No Known Allergies    PAST MEDICAL, SURGICAL, FAMILY, SOCIAL HISTORY:  History was reviewed and updated as needed, see medical record.    Review of Systems:  A 12-point review of systems was completed, see medical record for detailed review of systems information.    Physical Exam:  Vitals: /70   Pulse 50   Ht 1.676 m (5' 6\")   Wt 82.1 kg (181 lb)   BMI 29.21 kg/m      Constitutional:           Skin:           Head:           Eyes:           ENT:           Neck:           Chest:           Cardiac:           "          Abdomen:           Vascular:                                        Extremities and Back:           Neurological:           ASSESSMENT: Stable.        RECOMMENDATIONS: change to plavix to complete one year of therapy.        Recent Lab Results:  LIPID RESULTS:  Lab Results   Component Value Date    CHOL 121 01/24/2019    HDL 60 01/24/2019    LDL 49 01/24/2019    TRIG 59 01/24/2019    CHOLHDLRATIO 2.5 11/12/2010       LIVER ENZYME RESULTS:  Lab Results   Component Value Date    ALT 33 01/24/2019       CBC RESULTS:  Lab Results   Component Value Date    WBC 10.4 10/05/2018    RBC 4.17 (L) 10/05/2018    HGB 13.2 (L) 10/05/2018    HCT 38.2 (L) 10/05/2018    MCV 92 10/05/2018    MCH 31.7 10/05/2018    MCHC 34.6 10/05/2018    RDW 13.0 10/05/2018     10/05/2018       BMP RESULTS:  Lab Results   Component Value Date     01/24/2019    POTASSIUM 4.1 01/24/2019    CHLORIDE 106 01/24/2019    CO2 27 01/24/2019    ANIONGAP 5 01/24/2019    GLC 89 01/24/2019    BUN 18 01/24/2019    CR 1.11 01/24/2019    GFRESTIMATED 68 01/24/2019    GFRESTBLACK 79 01/24/2019    MANNIE 8.5 01/24/2019        A1C RESULTS:  No results found for: A1C    INR RESULTS:  Lab Results   Component Value Date    INR  11/28/2012     Charge credited   Test canceled by PCU/Clinic  PRIMO  CORRECTED ON 11/28 AT 0911: PREVIOUSLY REPORTED AS 0.96       We greatly appreciate the opportunity to be involved in the care of your patient, Hardy Landry.    Sincerely,  Jeet Simon MD        Lamonte Leslie  [facility]  [address] [place] [address]

## 2019-10-28 DIAGNOSIS — I21.11 ST ELEVATION MYOCARDIAL INFARCTION INVOLVING RIGHT CORONARY ARTERY (H): ICD-10-CM

## 2019-10-28 RX ORDER — SPIRONOLACTONE 25 MG/1
25 TABLET ORAL DAILY
Qty: 90 TABLET | Refills: 3 | Status: SHIPPED | OUTPATIENT
Start: 2019-10-28

## 2019-10-28 RX ORDER — METOPROLOL SUCCINATE 25 MG/1
25 TABLET, EXTENDED RELEASE ORAL DAILY
Qty: 90 TABLET | Refills: 3 | Status: SHIPPED | OUTPATIENT
Start: 2019-10-28

## 2020-01-23 DIAGNOSIS — I21.11 ST ELEVATION MYOCARDIAL INFARCTION INVOLVING RIGHT CORONARY ARTERY (H): ICD-10-CM

## 2020-01-23 RX ORDER — LOSARTAN POTASSIUM 50 MG/1
50 TABLET ORAL DAILY
Qty: 90 TABLET | Refills: 1 | Status: SHIPPED | OUTPATIENT
Start: 2020-01-23 | End: 2020-08-19

## 2020-01-31 DIAGNOSIS — I21.11 ST ELEVATION MYOCARDIAL INFARCTION INVOLVING RIGHT CORONARY ARTERY (H): ICD-10-CM

## 2020-01-31 RX ORDER — ATORVASTATIN CALCIUM 40 MG/1
40 TABLET, FILM COATED ORAL EVERY EVENING
Qty: 90 TABLET | Refills: 2 | Status: SHIPPED | OUTPATIENT
Start: 2020-01-31

## 2020-08-19 DIAGNOSIS — I21.11 ST ELEVATION MYOCARDIAL INFARCTION INVOLVING RIGHT CORONARY ARTERY (H): ICD-10-CM

## 2020-08-19 RX ORDER — LOSARTAN POTASSIUM 50 MG/1
50 TABLET ORAL DAILY
Qty: 90 TABLET | Refills: 0 | Status: SHIPPED | OUTPATIENT
Start: 2020-08-19

## 2020-11-23 ENCOUNTER — TELEPHONE (OUTPATIENT)
Dept: CARDIOLOGY | Facility: CLINIC | Age: 70
End: 2020-11-23

## 2020-11-23 NOTE — TELEPHONE ENCOUNTER
Fax sent to [facility] to deny refill of Metoprolol. Patient is overdue for an appointment.

## 2021-10-20 ENCOUNTER — HOSPITAL ENCOUNTER (EMERGENCY)
Facility: CLINIC | Age: 71
Discharge: HOME | End: 2021-10-20
Attending: EMERGENCY MEDICINE | Admitting: EMERGENCY MEDICINE
Payer: COMMERCIAL

## 2021-10-20 VITALS
SYSTOLIC BLOOD PRESSURE: 131 MMHG | WEIGHT: 186.51 LBS | OXYGEN SATURATION: 95 % | HEART RATE: 72 BPM | BODY MASS INDEX: 30.1 KG/M2 | TEMPERATURE: 97.8 F | RESPIRATION RATE: 20 BRPM | DIASTOLIC BLOOD PRESSURE: 80 MMHG

## 2021-10-20 DIAGNOSIS — J45.21 MILD INTERMITTENT ASTHMA WITH ACUTE EXACERBATION: ICD-10-CM

## 2021-10-20 PROCEDURE — 250N000013 HC RX MED GY IP 250 OP 250 PS 637: Performed by: EMERGENCY MEDICINE

## 2021-10-20 PROCEDURE — 999N000105 HC STATISTIC NO DOCUMENTATION TO SUPPORT CHARGE

## 2021-10-20 PROCEDURE — 94640 AIRWAY INHALATION TREATMENT: CPT

## 2021-10-20 PROCEDURE — 99283 EMERGENCY DEPT VISIT LOW MDM: CPT | Mod: 25

## 2021-10-20 RX ORDER — ALBUTEROL SULFATE 90 UG/1
2 AEROSOL, METERED RESPIRATORY (INHALATION)
Status: ACTIVE | OUTPATIENT
Start: 2021-10-20 | End: 2021-10-20

## 2021-10-20 RX ORDER — ALBUTEROL SULFATE 90 UG/1
2 AEROSOL, METERED RESPIRATORY (INHALATION) EVERY 4 HOURS PRN
Qty: 8 G | Refills: 0 | Status: SHIPPED | OUTPATIENT
Start: 2021-10-20 | End: 2021-11-19

## 2021-10-20 RX ADMIN — ALBUTEROL SULFATE 2 PUFF: 90 AEROSOL, METERED RESPIRATORY (INHALATION) at 02:03

## 2021-10-20 ASSESSMENT — ENCOUNTER SYMPTOMS: WHEEZING: 1

## 2021-10-20 NOTE — ED PROVIDER NOTES
History   Chief Complaint:  Asthma       HPI   Hardy Landry is a 71 year old male with history of asthma, GERD, CAD, and MI who presents with wheezing. The patient reports that he was experiencing acid reflux which triggered his asthma, but he did not have an inhaler. He denies chest pain and states that his symptoms do not feel similar to his previous heart attacks.     Review of Systems   Respiratory: Positive for wheezing.    Cardiovascular: Negative for chest pain.   All other systems reviewed and are negative.    Allergies:  The patient has no known allergies.     Medications:  Aspirin  Lipitor  Plavix  Cozaar  Toprol  Nitrostat  Omeprazole  Zantac  Aldactone    Past Medical History:     CAD  GERD  Hyperlipidemia  MI  STEMI  Asthma     Past Surgical History:    Angiogram  Heart cath angioplasty  PTCA and stent RCA  PTCA mid LAD     Family History:    Father: colon cancer  Mother: CAD  Brother: hypertension, colon cancer, CAD  Sister: hernia    Social History:  Patient presents with his wife    Physical Exam     Patient Vitals for the past 24 hrs:   BP Temp Temp src Pulse Resp SpO2 Weight   10/20/21 0148 131/80 97.8  F (36.6  C) Temporal 72 20 95 % 84.6 kg (186 lb 8.2 oz)     Physical Exam  General: Patient is alert, awake and interactive when I enter the room  Head: The scalp, face, and head appear normal  Eyes: Conjunctivae are normal  ENT: The nose is normal, Pinnae are normal, External acoustic canals are normal  Neck: Trachea midline  CV: Pulses are normal.   Resp: No respiratory distress, no wheezing or rales.   Musc: Normal muscular tone, moving all extremities.  Skin: No rash or lesions noted  Neuro: Speech is normal and fluent. Face is symmetric.   Psych: Normal affect.  Appropriate interactions.    Emergency Department Course     Emergency Department Course:  Reviewed:  I reviewed nursing notes, vitals and past medical history    Assessments:  0312 I obtained history and examined the patient as  noted above.     Interventions:  0203 Albuterol 2 puff inhalation    Disposition:  The patient was discharged to home.     Impression & Plan     Medical Decision Making:  Patient is a 71-year-old with past medical history of asthma and GERD who presents emergency department with wheezing that he believes was triggered by his acid reflux this evening.  He denies any chest pain or similar symptoms to his previous heart attack.  Patient was treated in triage with albuterol inhaler with complete resolution of his symptoms.  On reevaluation he is breathing comfortably and does not have any wheezing on my exam.  Discussed performing further work-up but the patient declined.  At this point, he is stable and safe to go home.    Diagnosis:    ICD-10-CM    1. Mild intermittent asthma with acute exacerbation  J45.21        Discharge Medications:  Discharge Medication List as of 10/20/2021  3:17 AM      START taking these medications    Details   albuterol (PROAIR HFA/PROVENTIL HFA/VENTOLIN HFA) 108 (90 Base) MCG/ACT inhaler Inhale 2 puffs into the lungs every 4 hours as needed for shortness of breath / dyspnea or wheezing, Disp-8 g, R-0, [facility] may dispense brand covered by insurance (Proair, or proventil or ventolin or generic albuterol inhaler)             Scribe Disclosure:  I, Magdalena Juarez, am serving as a scribe at 3:13 AM on 10/20/2021 to document services personally performed by Kumar Sarah MD based on my observations and the provider's statements to me.          Kumar Sarah MD  10/20/21 2583

## (undated) RX ORDER — LIDOCAINE HYDROCHLORIDE 10 MG/ML
INJECTION, SOLUTION EPIDURAL; INFILTRATION; INTRACAUDAL; PERINEURAL
Status: DISPENSED
Start: 2018-10-04

## (undated) RX ORDER — FENTANYL CITRATE 50 UG/ML
INJECTION, SOLUTION INTRAMUSCULAR; INTRAVENOUS
Status: DISPENSED
Start: 2018-10-04

## (undated) RX ORDER — HEPARIN SODIUM 1000 [USP'U]/ML
INJECTION, SOLUTION INTRAVENOUS; SUBCUTANEOUS
Status: DISPENSED
Start: 2018-10-04

## (undated) RX ORDER — ATROPINE SULFATE 0.1 MG/ML
INJECTION INTRAVENOUS
Status: DISPENSED
Start: 2018-10-04

## (undated) RX ORDER — VERAPAMIL HYDROCHLORIDE 2.5 MG/ML
INJECTION, SOLUTION INTRAVENOUS
Status: DISPENSED
Start: 2018-10-04

## (undated) RX ORDER — DOPAMINE HYDROCHLORIDE 160 MG/100ML
INJECTION, SOLUTION INTRAVENOUS
Status: DISPENSED
Start: 2018-10-04

## (undated) RX ORDER — PHENYLEPHRINE HCL IN 0.9% NACL 1 MG/10 ML
SYRINGE (ML) INTRAVENOUS
Status: DISPENSED
Start: 2018-10-04